# Patient Record
Sex: MALE | Race: OTHER | HISPANIC OR LATINO | Employment: UNEMPLOYED | URBAN - METROPOLITAN AREA
[De-identification: names, ages, dates, MRNs, and addresses within clinical notes are randomized per-mention and may not be internally consistent; named-entity substitution may affect disease eponyms.]

---

## 2017-10-10 ENCOUNTER — HOSPITAL ENCOUNTER (EMERGENCY)
Facility: HOSPITAL | Age: 37
Discharge: HOME | End: 2017-10-11
Attending: EMERGENCY MEDICINE
Payer: SUBSIDIZED

## 2017-10-10 VITALS
RESPIRATION RATE: 18 BRPM | HEART RATE: 75 BPM | OXYGEN SATURATION: 98 % | TEMPERATURE: 99 F | BODY MASS INDEX: 26.06 KG/M2 | HEIGHT: 67 IN | DIASTOLIC BLOOD PRESSURE: 69 MMHG | WEIGHT: 166 LBS | SYSTOLIC BLOOD PRESSURE: 144 MMHG

## 2017-10-10 DIAGNOSIS — N39.0 UTI (URINARY TRACT INFECTION): Primary | ICD-10-CM

## 2017-10-10 DIAGNOSIS — N30.90 CYSTITIS: ICD-10-CM

## 2017-10-10 PROCEDURE — 87077 CULTURE AEROBIC IDENTIFY: CPT | Performed by: EMERGENCY MEDICINE

## 2017-10-10 PROCEDURE — 81001 URINALYSIS AUTO W/SCOPE: CPT | Performed by: EMERGENCY MEDICINE

## 2017-10-10 PROCEDURE — 87086 URINE CULTURE/COLONY COUNT: CPT | Performed by: EMERGENCY MEDICINE

## 2017-10-10 PROCEDURE — 87186 SC STD MICRODIL/AGAR DIL: CPT | Performed by: EMERGENCY MEDICINE

## 2017-10-10 RX ORDER — KETOROLAC TROMETHAMINE 30 MG/ML
30 INJECTION, SOLUTION INTRAMUSCULAR; INTRAVENOUS ONCE
Status: COMPLETED | OUTPATIENT
Start: 2017-10-11 | End: 2017-10-11

## 2017-10-10 RX ORDER — ONDANSETRON 2 MG/ML
4 INJECTION INTRAMUSCULAR; INTRAVENOUS ONCE
Status: COMPLETED | OUTPATIENT
Start: 2017-10-11 | End: 2017-10-11

## 2017-10-11 ENCOUNTER — APPOINTMENT (EMERGENCY)
Dept: RADIOLOGY | Facility: HOSPITAL | Age: 37
End: 2017-10-11
Payer: SUBSIDIZED

## 2017-10-11 LAB
ANION GAP SERPL CALCULATED.3IONS-SCNC: 11 MMOL/L (ref 4–13)
BACTERIA UR QL AUTO: ABNORMAL /HPF
BASOPHILS # BLD AUTO: 0 THOUSANDS/ΜL (ref 0–0.1)
BASOPHILS NFR BLD AUTO: 0 % (ref 0–1)
BILIRUB UR QL STRIP: NEGATIVE
BUN SERPL-MCNC: 15 MG/DL (ref 5–25)
CALCIUM SERPL-MCNC: 9.4 MG/DL (ref 8.3–10.1)
CHLORIDE SERPL-SCNC: 104 MMOL/L (ref 100–108)
CLARITY UR: ABNORMAL
CO2 SERPL-SCNC: 25 MMOL/L (ref 21–32)
COLOR UR: ABNORMAL
CREAT SERPL-MCNC: 0.95 MG/DL (ref 0.6–1.3)
EOSINOPHIL # BLD AUTO: 0.1 THOUSAND/ΜL (ref 0–0.61)
EOSINOPHIL NFR BLD AUTO: 1 % (ref 0–6)
ERYTHROCYTE [DISTWIDTH] IN BLOOD BY AUTOMATED COUNT: 13.9 % (ref 11.6–15.1)
GFR SERPL CREATININE-BSD FRML MDRD: 100 ML/MIN/1.73SQ M
GLUCOSE SERPL-MCNC: 96 MG/DL (ref 65–140)
GLUCOSE UR STRIP-MCNC: NEGATIVE MG/DL
HCT VFR BLD AUTO: 43.4 % (ref 42–52)
HGB BLD-MCNC: 14.1 G/DL (ref 14–18)
HGB UR QL STRIP.AUTO: ABNORMAL
KETONES UR STRIP-MCNC: NEGATIVE MG/DL
LEUKOCYTE ESTERASE UR QL STRIP: ABNORMAL
LG PLATELETS BLD QL SMEAR: PRESENT
LYMPHOCYTES # BLD AUTO: 1.8 THOUSANDS/ΜL (ref 0.6–4.47)
LYMPHOCYTES NFR BLD AUTO: 14 % (ref 14–44)
MCH RBC QN AUTO: 27.9 PG (ref 27–31)
MCHC RBC AUTO-ENTMCNC: 32.5 G/DL (ref 31.4–37.4)
MCV RBC AUTO: 86 FL (ref 82–98)
MONOCYTES # BLD AUTO: 1 THOUSAND/ΜL (ref 0.17–1.22)
MONOCYTES NFR BLD AUTO: 7 % (ref 4–12)
NEUTROPHILS # BLD AUTO: 10.5 THOUSANDS/ΜL (ref 1.85–7.62)
NEUTS SEG NFR BLD AUTO: 78 % (ref 43–75)
NITRITE UR QL STRIP: NEGATIVE
NON-SQ EPI CELLS URNS QL MICRO: ABNORMAL /HPF
NRBC BLD AUTO-RTO: 0 /100 WBCS
PH UR STRIP.AUTO: 6 [PH] (ref 5–9)
PLATELET # BLD AUTO: 154 THOUSANDS/UL (ref 130–400)
PLATELET BLD QL SMEAR: ADEQUATE
PMV BLD AUTO: 9.6 FL (ref 8.9–12.7)
POTASSIUM SERPL-SCNC: 3.9 MMOL/L (ref 3.5–5.3)
PROT UR STRIP-MCNC: ABNORMAL MG/DL
RBC # BLD AUTO: 5.05 MILLION/UL (ref 4.7–6.1)
RBC #/AREA URNS AUTO: ABNORMAL /HPF
SODIUM SERPL-SCNC: 140 MMOL/L (ref 136–145)
SP GR UR STRIP.AUTO: <=1.005 (ref 1–1.03)
UROBILINOGEN UR QL STRIP.AUTO: 0.2 E.U./DL
WBC # BLD AUTO: 13.4 THOUSAND/UL (ref 4.8–10.8)
WBC #/AREA URNS AUTO: ABNORMAL /HPF

## 2017-10-11 PROCEDURE — 96374 THER/PROPH/DIAG INJ IV PUSH: CPT

## 2017-10-11 PROCEDURE — 36415 COLL VENOUS BLD VENIPUNCTURE: CPT | Performed by: EMERGENCY MEDICINE

## 2017-10-11 PROCEDURE — 74176 CT ABD & PELVIS W/O CONTRAST: CPT

## 2017-10-11 PROCEDURE — 96375 TX/PRO/DX INJ NEW DRUG ADDON: CPT

## 2017-10-11 PROCEDURE — 80048 BASIC METABOLIC PNL TOTAL CA: CPT | Performed by: EMERGENCY MEDICINE

## 2017-10-11 PROCEDURE — 85025 COMPLETE CBC W/AUTO DIFF WBC: CPT | Performed by: EMERGENCY MEDICINE

## 2017-10-11 PROCEDURE — 99284 EMERGENCY DEPT VISIT MOD MDM: CPT

## 2017-10-11 PROCEDURE — 96361 HYDRATE IV INFUSION ADD-ON: CPT

## 2017-10-11 RX ORDER — ONDANSETRON 4 MG/1
4 TABLET, FILM COATED ORAL EVERY 6 HOURS
Qty: 9 TABLET | Refills: 0 | Status: SHIPPED | OUTPATIENT
Start: 2017-10-11 | End: 2017-10-14

## 2017-10-11 RX ORDER — SULFAMETHOXAZOLE AND TRIMETHOPRIM 800; 160 MG/1; MG/1
1 TABLET ORAL 2 TIMES DAILY
Qty: 14 TABLET | Refills: 0 | Status: SHIPPED | OUTPATIENT
Start: 2017-10-11 | End: 2017-10-18

## 2017-10-11 RX ORDER — OXYCODONE HYDROCHLORIDE AND ACETAMINOPHEN 5; 325 MG/1; MG/1
1 TABLET ORAL EVERY 8 HOURS PRN
Qty: 10 TABLET | Refills: 0 | Status: SHIPPED | OUTPATIENT
Start: 2017-10-11 | End: 2017-10-14

## 2017-10-11 RX ORDER — NAPROXEN 500 MG/1
500 TABLET ORAL 2 TIMES DAILY WITH MEALS
Qty: 10 TABLET | Refills: 0 | Status: SHIPPED | OUTPATIENT
Start: 2017-10-11 | End: 2017-10-16

## 2017-10-11 RX ORDER — SULFAMETHOXAZOLE AND TRIMETHOPRIM 800; 160 MG/1; MG/1
1 TABLET ORAL ONCE
Status: COMPLETED | OUTPATIENT
Start: 2017-10-11 | End: 2017-10-11

## 2017-10-11 RX ADMIN — KETOROLAC TROMETHAMINE 30 MG: 30 INJECTION, SOLUTION INTRAMUSCULAR at 00:25

## 2017-10-11 RX ADMIN — SODIUM CHLORIDE 1000 ML: 0.9 INJECTION, SOLUTION INTRAVENOUS at 00:38

## 2017-10-11 RX ADMIN — HYDROMORPHONE HYDROCHLORIDE 1 MG: 1 INJECTION, SOLUTION INTRAMUSCULAR; INTRAVENOUS; SUBCUTANEOUS at 00:42

## 2017-10-11 RX ADMIN — SULFAMETHOXAZOLE AND TRIMETHOPRIM 1 TABLET: 800; 160 TABLET ORAL at 00:42

## 2017-10-11 RX ADMIN — ONDANSETRON 4 MG: 2 INJECTION INTRAMUSCULAR; INTRAVENOUS at 00:26

## 2017-10-11 NOTE — ED PROVIDER NOTES
History  Chief Complaint   Patient presents with    Abdominal Pain     patient c/o lower abdominal pain does has a history of kidney stones     20-year-old male presents with dysuria urgency frequency with urinary symptoms as well as lower abdominal pain that started a day ago  History of kidney stones noted  Hematuria noted  Not on anticoagulation  Denies any nausea vomiting diarrhea constipation fevers chills or any other symptoms  The pain is continuous currently sharp 10/10 nothing makes it better or worse  History provided by:  Patient   used: No        None       Past Medical History:   Diagnosis Date    Kidney stone        Past Surgical History:   Procedure Laterality Date    KIDNEY STONE SURGERY         History reviewed  No pertinent family history  I have reviewed and agree with the history as documented  Social History   Substance Use Topics    Smoking status: Never Smoker    Smokeless tobacco: Never Used    Alcohol use No        Review of Systems   All other systems reviewed and are negative  Physical Exam  ED Triage Vitals [10/10/17 2354]   Temperature Pulse Respirations Blood Pressure SpO2   99 °F (37 2 °C) 75 18 144/69 98 %      Temp Source Heart Rate Source Patient Position - Orthostatic VS BP Location FiO2 (%)   Oral -- Sitting Left arm --      Pain Score       8           Physical Exam   Constitutional: He is oriented to person, place, and time  He appears well-developed and well-nourished  HENT:   Head: Normocephalic and atraumatic  Eyes: EOM are normal  Pupils are equal, round, and reactive to light  Neck: Normal range of motion  Neck supple  Cardiovascular: Normal rate and regular rhythm  Pulmonary/Chest: Effort normal and breath sounds normal    Abdominal: Soft  Bowel sounds are normal  He exhibits no distension and no mass  There is tenderness  There is no rebound and no guarding  No hernia     Diffuse lower abdominal tenderness noted Musculoskeletal: Normal range of motion  Neurological: He is alert and oriented to person, place, and time  Skin: Skin is warm and dry  Psychiatric: He has a normal mood and affect  Nursing note and vitals reviewed        ED Medications  Medications   sodium chloride 0 9 % bolus 1,000 mL (1,000 mL Intravenous New Bag 10/11/17 0038)   sulfamethoxazole-trimethoprim (BACTRIM DS) 800-160 mg per tablet 1 tablet (not administered)   ketorolac (TORADOL) 30 mg/mL injection 30 mg (30 mg Intravenous Given 10/11/17 0025)   ondansetron (ZOFRAN) injection 4 mg (4 mg Intravenous Given 10/11/17 0026)       Diagnostic Studies  Labs Reviewed   UA W REFLEX TO MICROSCOPIC WITH REFLEX TO CULTURE - Abnormal        Result Value Ref Range Status    Leukocytes, UA Large (*) Negative Final    Protein,  (2+) (*) Negative mg/dl Final    Blood, UA Large (*) Negative Final    Color, UA Cora   Final    Clarity, UA Slightly Cloudy   Final    Specific Gravity, UA <=1 005  1 000 - 1 030 Final    pH, UA 6 0  5 0 - 9 0 Final    Nitrite, UA Negative  Negative Final    Glucose, UA Negative  Negative mg/dl Final    Ketones, UA Negative  Negative mg/dl Final    Urobilinogen, UA 0 2  0 2, 1 0 E U /dl E U /dl Final    Bilirubin, UA Negative  Negative Final   CBC AND DIFFERENTIAL - Abnormal     WBC 13 40 (*) 4 80 - 10 80 Thousand/uL Final    Neutrophils Relative 78 (*) 43 - 75 % Final    Neutrophils Absolute 10 50 (*) 1 85 - 7 62 Thousands/µL Final    RBC 5 05  4 70 - 6 10 Million/uL Final    Hemoglobin 14 1  14 0 - 18 0 g/dL Final    Hematocrit 43 4  42 0 - 52 0 % Final    MCV 86  82 - 98 fL Final    MCH 27 9  27 0 - 31 0 pg Final    MCHC 32 5  31 4 - 37 4 g/dL Final    RDW 13 9  11 6 - 15 1 % Final    MPV 9 6  8 9 - 12 7 fL Final    Platelets 043  428 - 400 Thousands/uL Final    nRBC 0  /100 WBCs Final    Lymphocytes Relative 14  14 - 44 % Final    Monocytes Relative 7  4 - 12 % Final    Eosinophils Relative 1  0 - 6 % Final    Basophils Relative 0  0 - 1 % Final    Lymphocytes Absolute 1 80  0 60 - 4 47 Thousands/µL Final    Monocytes Absolute 1 00  0 17 - 1 22 Thousand/µL Final    Eosinophils Absolute 0 10  0 00 - 0 61 Thousand/µL Final    Basophils Absolute 0 00  0 00 - 0 10 Thousands/µL Final   URINE MICROSCOPIC - Abnormal     RBC, UA 10-20 (*) None Seen, 0-5 /hpf Final    WBC, UA Innumerable (*) None Seen, 0-5, 5-55, 5-65 /hpf Final    Epithelial Cells None Seen  None Seen, Occasional /hpf Final    Bacteria, UA Occasional  None Seen, Occasional /hpf Final   URINE CULTURE   BASIC METABOLIC PANEL    Sodium 423  136 - 145 mmol/L Final    Potassium 3 9  3 5 - 5 3 mmol/L Final    Chloride 104  100 - 108 mmol/L Final    CO2 25  21 - 32 mmol/L Final    Anion Gap 11  4 - 13 mmol/L Final    BUN 15  5 - 25 mg/dL Final    Creatinine 0 95  0 60 - 1 30 mg/dL Final    Comment: Standardized to IDMS reference method    Glucose 96  65 - 140 mg/dL Final    Comment:   If the patient is fasting, the ADA then defines impaired fasting glucose as > 100 mg/dL and diabetes as > or equal to 123 mg/dL  Specimen collection should occur prior to Sulfasalazine administration due to the potential for falsely depressed results  Specimen collection should occur prior to Sulfapyridine administration due to the potential for falsely elevated results  Calcium 9 4  8 3 - 10 1 mg/dL Final    eGFR 100  ml/min/1 73sq m Final    Narrative:     National Kidney Disease Education Program recommendations are as follows:  GFR calculation is accurate only with a steady state creatinine  Chronic Kidney disease less than 60 ml/min/1 73 sq  meters  Kidney failure less than 15 ml/min/1 73 sq  meters     SMEAR REVIEW(PHLEBS DO NOT ORDER)    Platelet Estimate Adequate  Adequate Final    Large Platelet Present   Final       CT renal stone study abdomen pelvis without contrast    (Results Pending)       Procedures  Procedures      Phone Contacts  ED Phone Contact    ED Course  ED Course MDM  Number of Diagnoses or Management Options  Diagnosis management comments: Ureteral stone, UTI, pyelonephritis, colitis, diverticulitis, abdominal pathology, abdominal pain    CT scan of the abdomen/pelvis pending care transitioned to Dr Regan Lancaster and/or Complexity of Data Reviewed  Clinical lab tests: ordered and reviewed  Tests in the radiology section of CPT®: ordered  Tests in the medicine section of CPT®: ordered and reviewed    Patient Progress  Patient progress: stable    CritCare Time    Disposition  Final diagnoses:   Flank pain   UTI (urinary tract infection)     ED Disposition     None      Follow-up Information    None       Patient's Medications    No medications on file     No discharge procedures on file      ED Provider  Electronically Signed by       Yuki Viera DO  10/11/17 7764

## 2017-10-11 NOTE — ED CARE HANDOFF
Emergency Department Sign Out Note        Sign out and transfer of care from Dr Pavel Camejo  See Separate Emergency Department note  The patient, Marquez Dacosta, was evaluated by the previous provider for hematuria, dysuria, low abd pain    Workup Completed:  Labs/UA    ED Course / Workup Pending (followup):  CT abd/pelvis report    Pt received in sign out, pending CT abd/pelvis report  CT reviewed  No acute pathology  Pt seen and evaluated  + urinary symptoms and low abd pain  Pt with UTI/cystitis  Will d/c to home with PCP f/u  ED Course      Procedures  MDM  CritCare Time      Disposition  Final diagnoses:   Flank pain   UTI (urinary tract infection)     ED Disposition     None      Follow-up Information    None       Patient's Medications   Discharge Prescriptions    NAPROXEN (NAPROSYN) 500 MG TABLET    Take 1 tablet by mouth 2 (two) times a day with meals for 5 days       Start Date: 10/11/2017End Date: 10/16/2017       Order Dose: 500 mg       Quantity: 10 tablet    Refills: 0    ONDANSETRON (ZOFRAN) 4 MG TABLET    Take 1 tablet by mouth every 6 (six) hours for 3 days       Start Date: 10/11/2017End Date: 10/14/2017       Order Dose: 4 mg       Quantity: 9 tablet    Refills: 0    OXYCODONE-ACETAMINOPHEN (PERCOCET) 5-325 MG PER TABLET    Take 1 tablet by mouth every 8 (eight) hours as needed for moderate pain for up to 3 days Max Daily Amount: 3 tablets       Start Date: 10/11/2017End Date: 10/14/2017       Order Dose: 1 tablet       Quantity: 10 tablet    Refills: 0    SULFAMETHOXAZOLE-TRIMETHOPRIM (BACTRIM DS) 800-160 MG PER TABLET    Take 1 tablet by mouth 2 (two) times a day for 7 days smx-tmp DS (BACTRIM) 800-160 mg tabs (1tab q12 D10)       Start Date: 10/11/2017End Date: 10/18/2017       Order Dose: 1 tablet       Quantity: 14 tablet    Refills: 0     No discharge procedures on file         ED Provider  Electronically Signed by

## 2017-10-11 NOTE — DISCHARGE INSTRUCTIONS
Infección del tracto urinario en los hombres   LO QUE NECESITA SABER:   Yessy infección en el tracto urinario (ITU) ocurre cuando entran bacterias en lyman tracto urinario  La mayoría de las bacterias que entran al tracto urinario salen al Estela Cornet  Si la bacteria permanece en el tracto urinario, usted podría contraer Pitney Spencer  Lyman tracto urinario incluye francisca riñones, uréteres, vejiga y Gondregnies  La orina es producida en los riñones y fluye del uréter a la vejiga  La orina sale de la vejiga a través de la uretra  Yessy infección del tracto urinario es más común in [place] parte inferior de lyman tracto urinario que incluye lyman vejiga y uretra  INSTRUCCIONES SOBRE EL [facility]ERMAINE HOSPITALARIA:   Regrese a la delmi de emergencias si:   · Usted está orinando muy poco o nada en absoluto  · Usted tiene fiebre jermaine con temblor y escalofríos  · Usted tiene dolor en el costado o en la espalda que STACEY  Pregúntele a lyman Cir[employer] vitaminas y minerales son adecuados para usted  · Usted tiene fiebre leve  · Usted no siente mejoría después de 2 días de vargas los antibióticos  · Usted esta vomitando  · Usted tiene síntomas nuevos, tales janet pankaj o pus en la orina  · Usted tiene preguntas o inquietudes acerca de lyman condición o cuidado  Medicamentos:   · Antibióticos  ayudan a combatir yessy infección bacterial      · Medicamentos,  para disminuir el dolor y el ardor al orinar  También ayudarán a disminuir la sensación de necesitar orinar con frecuencia  Estos medicamentos harán que orine de color anaranjado o rodríguez  · [place] francisca medicamentos janet se le haya indicado  Consulte con lyman médico si usted kristin que lyman medicamento no le está ayudando o si presenta efectos secundarios  Infórmele si es alérgico a algún medicamento  Mantenga yessy lista actualizada de los [place], las vitaminas y los productos herbales que jelly  Incluya los siguientes datos de los medicamentos: cantidad, frecuencia y motivo de administración  Traiga con usted la lista o los envases de la píldoras a francisca citas de seguimiento  Lleve la lista de los medicamentos con usted en kimberly de yessy emergencia  Evite otra ITU:   · Vacíe la vejiga con frecuencia  Orine y vacíe la vejiga tan pronto janet usted sienta la necesidad  No retenga la orina por largos períodos de [place]  · [address] se le haya indicado  Pregunte cuánto líquido debe vargas cada día y cuáles líquidos son los más adecuados para usted  Es probable que usted necesite vargas más líquidos de lo habitual para ayudar a deshacerse de la bacteria  No tome alcohol, cafeína ni jugos cítricos  Estos pueden irritar hogue vejiga y aumentar francisca síntomas  Hogue médico puede recomendarle el jugo de arándano para prevenir yessy infección Chata Dorsey  · Orine después de [employer]  [place] puede ayudar a eliminar las bacterias que pasan maribell el sexo  · Realice ejercicios para los músculos pélvicos con frecuencia  Los músculos pélvicos ayudan a empezar y parar de orinar  Los músculos pélvicos anton ayudan a vaciar la vejiga más fácilmente  Apriete estos músculos firmemente maribell 5 segundos janet si estuviera tratando de retener el flujo de [place]  Luego relaje por 5 segundos  Aumente gradualmente a 10 segundos  [place]ade 3 series de 15 repeticiones al día o janet se le indique  Acuda a francisca consultas de control con hogue médico según le indicaron  Anote francisca preguntas para que se acuerde de hacerlas maribell francisca visitas  © 2017 [address] Information is for End User's use only and may not be sold, redistributed or otherwise used for commercial purposes  All illustrations and images included in CareNotes® are the copyrighted property of A D A M , Inc  or Caesar Garay  Esta información es sólo para uso en educación  Hogue intención no es darle un consejo médico sobre enfermedades o tratamientos   Colsulte con hogue Dorna Claudio farmacéutico antes de seguir cualquier [place] médico para saber si es seguro y efectivo para usted

## 2017-10-13 LAB — BACTERIA UR CULT: ABNORMAL

## 2018-09-22 ENCOUNTER — HOSPITAL ENCOUNTER (EMERGENCY)
Facility: HOSPITAL | Age: 38
Discharge: HOME | End: 2018-09-22
Attending: EMERGENCY MEDICINE | Admitting: EMERGENCY MEDICINE
Payer: SUBSIDIZED

## 2018-09-22 ENCOUNTER — APPOINTMENT (EMERGENCY)
Dept: RADIOLOGY | Facility: HOSPITAL | Age: 38
End: 2018-09-22
Payer: SUBSIDIZED

## 2018-09-22 VITALS
HEIGHT: 67 IN | WEIGHT: 160 LBS | BODY MASS INDEX: 25.11 KG/M2 | TEMPERATURE: 97.8 F | RESPIRATION RATE: 22 BRPM | OXYGEN SATURATION: 100 % | HEART RATE: 76 BPM

## 2018-09-22 DIAGNOSIS — N40.0 ENLARGED PROSTATE: ICD-10-CM

## 2018-09-22 DIAGNOSIS — N13.2 HYDRONEPHROSIS WITH OBSTRUCTING CALCULUS: ICD-10-CM

## 2018-09-22 DIAGNOSIS — N23 URETERAL COLIC: Primary | ICD-10-CM

## 2018-09-22 LAB
ALBUMIN SERPL BCP-MCNC: 3.9 G/DL (ref 3.5–5)
ALP SERPL-CCNC: 68 U/L (ref 46–116)
ALT SERPL W P-5'-P-CCNC: 39 U/L (ref 12–78)
ANION GAP SERPL CALCULATED.3IONS-SCNC: 10 MMOL/L (ref 4–13)
AST SERPL W P-5'-P-CCNC: 19 U/L (ref 5–45)
BACTERIA UR QL AUTO: ABNORMAL /HPF
BASOPHILS # BLD AUTO: 0.03 THOUSANDS/ΜL (ref 0–0.1)
BASOPHILS NFR BLD AUTO: 0 % (ref 0–1)
BILIRUB SERPL-MCNC: 0.3 MG/DL (ref 0.2–1)
BILIRUB UR QL STRIP: NEGATIVE
BUN SERPL-MCNC: 15 MG/DL (ref 5–25)
CALCIUM SERPL-MCNC: 9 MG/DL (ref 8.3–10.1)
CHLORIDE SERPL-SCNC: 103 MMOL/L (ref 100–108)
CLARITY UR: CLEAR
CO2 SERPL-SCNC: 24 MMOL/L (ref 21–32)
COLOR UR: YELLOW
CREAT SERPL-MCNC: 1.09 MG/DL (ref 0.6–1.3)
EOSINOPHIL # BLD AUTO: 0.16 THOUSAND/ΜL (ref 0–0.61)
EOSINOPHIL NFR BLD AUTO: 2 % (ref 0–6)
ERYTHROCYTE [DISTWIDTH] IN BLOOD BY AUTOMATED COUNT: 12.6 % (ref 11.6–15.1)
GFR SERPL CREATININE-BSD FRML MDRD: 84 ML/MIN/1.73SQ M
GLUCOSE SERPL-MCNC: 125 MG/DL (ref 65–140)
GLUCOSE UR STRIP-MCNC: NEGATIVE MG/DL
HCT VFR BLD AUTO: 43.1 % (ref 36.5–49.3)
HGB BLD-MCNC: 14.2 G/DL (ref 12–17)
HGB UR QL STRIP.AUTO: ABNORMAL
HOLD SPECIMEN: NORMAL
IMM GRANULOCYTES # BLD AUTO: 0.05 THOUSAND/UL (ref 0–0.2)
IMM GRANULOCYTES NFR BLD AUTO: 1 % (ref 0–2)
KETONES UR STRIP-MCNC: NEGATIVE MG/DL
LEUKOCYTE ESTERASE UR QL STRIP: NEGATIVE
LIPASE SERPL-CCNC: 124 U/L (ref 73–393)
LYMPHOCYTES # BLD AUTO: 3.7 THOUSANDS/ΜL (ref 0.6–4.47)
LYMPHOCYTES NFR BLD AUTO: 37 % (ref 14–44)
MCH RBC QN AUTO: 28.2 PG (ref 26.8–34.3)
MCHC RBC AUTO-ENTMCNC: 32.9 G/DL (ref 31.4–37.4)
MCV RBC AUTO: 86 FL (ref 82–98)
MONOCYTES # BLD AUTO: 0.81 THOUSAND/ΜL (ref 0.17–1.22)
MONOCYTES NFR BLD AUTO: 8 % (ref 4–12)
NEUTROPHILS # BLD AUTO: 5.21 THOUSANDS/ΜL (ref 1.85–7.62)
NEUTS SEG NFR BLD AUTO: 52 % (ref 43–75)
NITRITE UR QL STRIP: NEGATIVE
NON-SQ EPI CELLS URNS QL MICRO: ABNORMAL /HPF
NRBC BLD AUTO-RTO: 0 /100 WBCS
PH UR STRIP.AUTO: 6 [PH] (ref 5–9)
PLATELET # BLD AUTO: 221 THOUSANDS/UL (ref 149–390)
PMV BLD AUTO: 11.3 FL (ref 8.9–12.7)
POTASSIUM SERPL-SCNC: 3.4 MMOL/L (ref 3.5–5.3)
PROT SERPL-MCNC: 7.4 G/DL (ref 6.4–8.2)
PROT UR STRIP-MCNC: ABNORMAL MG/DL
RBC # BLD AUTO: 5.04 MILLION/UL (ref 3.88–5.62)
RBC #/AREA URNS AUTO: ABNORMAL /HPF
SODIUM SERPL-SCNC: 137 MMOL/L (ref 136–145)
SP GR UR STRIP.AUTO: >=1.03 (ref 1–1.03)
UROBILINOGEN UR QL STRIP.AUTO: 0.2 E.U./DL
WBC # BLD AUTO: 9.96 THOUSAND/UL (ref 4.31–10.16)
WBC #/AREA URNS AUTO: ABNORMAL /HPF

## 2018-09-22 PROCEDURE — 96374 THER/PROPH/DIAG INJ IV PUSH: CPT

## 2018-09-22 PROCEDURE — 83690 ASSAY OF LIPASE: CPT | Performed by: EMERGENCY MEDICINE

## 2018-09-22 PROCEDURE — 36415 COLL VENOUS BLD VENIPUNCTURE: CPT | Performed by: EMERGENCY MEDICINE

## 2018-09-22 PROCEDURE — 80053 COMPREHEN METABOLIC PANEL: CPT | Performed by: EMERGENCY MEDICINE

## 2018-09-22 PROCEDURE — 74176 CT ABD & PELVIS W/O CONTRAST: CPT

## 2018-09-22 PROCEDURE — 96361 HYDRATE IV INFUSION ADD-ON: CPT

## 2018-09-22 PROCEDURE — 96375 TX/PRO/DX INJ NEW DRUG ADDON: CPT

## 2018-09-22 PROCEDURE — 99284 EMERGENCY DEPT VISIT MOD MDM: CPT

## 2018-09-22 PROCEDURE — 85025 COMPLETE CBC W/AUTO DIFF WBC: CPT | Performed by: EMERGENCY MEDICINE

## 2018-09-22 PROCEDURE — 81001 URINALYSIS AUTO W/SCOPE: CPT | Performed by: EMERGENCY MEDICINE

## 2018-09-22 RX ORDER — TAMSULOSIN HYDROCHLORIDE 0.4 MG/1
0.4 CAPSULE ORAL
Qty: 30 CAPSULE | Refills: 0 | Status: SHIPPED | OUTPATIENT
Start: 2018-09-22

## 2018-09-22 RX ORDER — HYDROCODONE BITARTRATE AND ACETAMINOPHEN 5; 325 MG/1; MG/1
1 TABLET ORAL EVERY 6 HOURS PRN
Qty: 12 TABLET | Refills: 0 | Status: SHIPPED | OUTPATIENT
Start: 2018-09-22 | End: 2018-10-02

## 2018-09-22 RX ORDER — ONDANSETRON 4 MG/1
4 TABLET, FILM COATED ORAL EVERY 6 HOURS
Qty: 12 TABLET | Refills: 0 | Status: SHIPPED | OUTPATIENT
Start: 2018-09-22

## 2018-09-22 RX ORDER — IBUPROFEN 600 MG/1
600 TABLET ORAL EVERY 6 HOURS PRN
Qty: 30 TABLET | Refills: 0 | Status: SHIPPED | OUTPATIENT
Start: 2018-09-22

## 2018-09-22 RX ORDER — KETOROLAC TROMETHAMINE 30 MG/ML
30 INJECTION, SOLUTION INTRAMUSCULAR; INTRAVENOUS ONCE
Status: COMPLETED | OUTPATIENT
Start: 2018-09-22 | End: 2018-09-22

## 2018-09-22 RX ORDER — TAMSULOSIN HYDROCHLORIDE 0.4 MG/1
0.4 CAPSULE ORAL ONCE
Status: COMPLETED | OUTPATIENT
Start: 2018-09-22 | End: 2018-09-22

## 2018-09-22 RX ORDER — ONDANSETRON 2 MG/ML
4 INJECTION INTRAMUSCULAR; INTRAVENOUS ONCE
Status: COMPLETED | OUTPATIENT
Start: 2018-09-22 | End: 2018-09-22

## 2018-09-22 RX ADMIN — ONDANSETRON 4 MG: 2 INJECTION INTRAMUSCULAR; INTRAVENOUS at 06:38

## 2018-09-22 RX ADMIN — KETOROLAC TROMETHAMINE 30 MG: 30 INJECTION, SOLUTION INTRAMUSCULAR at 06:49

## 2018-09-22 RX ADMIN — SODIUM CHLORIDE 1000 ML: 0.9 INJECTION, SOLUTION INTRAVENOUS at 06:49

## 2018-09-22 RX ADMIN — TAMSULOSIN HYDROCHLORIDE 0.4 MG: 0.4 CAPSULE ORAL at 07:54

## 2018-09-22 NOTE — ED NOTES
Discharge instructions reviewed with patient at length  Patient states understanding  Patient discharged to home       nÁgela Hernandez RN  09/22/18 [id number]

## 2018-09-22 NOTE — DISCHARGE INSTRUCTIONS
Hipertrofia prostática benigna   LO QUE NECESITA SABER:   La hipertrofia prostática benigna (HPB) es yessy condición que provoca que la glándula prostática crezca mas cesar de lo normal  La glándula prostática es la glándula sexual masculina que produce el líquido que es parte del semen  Tiene el tamaño aproximadamente de [place] y está localizado debajo de la vejiga  Conforme la próstata crece, puede apretar la uretra  [place] puede obstruir el flujo de [place] y provocar problemas urinarios  INSTRUCCIONES SOBRE EL JERMAINE HOSPITALARIA:   Medicamentos:   · Bloqueante gerry:  Estos medicamentos relajan los músculos de la próstata y de la vejiga  Podrían ayudarlo a orinar con mas facilidad  · Inhibidores de la 5 gerry reductasa:  Estos medicamentos obstruyen la producción de yessy hormona que provoca que la próstata se agrande mas  Podrían ayudar a disminuir el crecimiento de la próstata o a encogerla  · [place] francisca medicamentos janet se le haya indicado  Consulte con lyman médico si usted kristin que lyman medicamento no le está ayudando o si presenta efectos secundarios  Infórmele si es alérgico a algún medicamento  Mantenga yessy lista actualizada de los [place], las vitaminas y los productos herbales que jelly  Incluya los siguientes datos de los medicamentos: cantidad, frecuencia y motivo de administración  Traiga con usted la lista o los envases de la píldoras a francisca citas de seguimiento  Lleve la lista de los medicamentos con usted en kimberly de yessy emergencia  Acuda a francisca consultas de control con lyman médico según le indicaron  Anote francisca preguntas para que se acuerde de hacerlas maribell francisca visitas  Controle la HPB:   · No permita que lyman vejiga se llene demasiado antes de vaciarla  Orine cuando sienta el impulso de [place]  · Limite el consumo de alcohol  No ingiera grandes cantidades de líquidos de [employer]  · Brianafurt cantidad de sal que consume   Por ejemplo, las aline fritas, las ashley curadas y las sopas enlatadas  No use sal de [place]  · Los médicos podrían indicarle que no consuma alimentos picantes, janet ASKER  [place] podría ayudarlo a determinar si la comida picante empeora los síntomas de HPB  · Usted puede tener relaciones sexuales si se siente raul  Pregúntele a hogue Sherol Mince vitaminas y minerales son adecuados para usted  · Hay yessy cantidad cesar de pankaj en la orina  · Francisca signos y síntomas empeoran  · Usted tiene fiebre  · Usted tiene preguntas o inquietudes acerca de hogue condición o cuidado  Regrese a la delmi de emergencias si:   · Usted no puede orinar  · Siente que la vejiga está muy llena y tiene dolor  © 2017 2600 Jeremy Vick Information is for End User's use only and may not be sold, redistributed or otherwise used for commercial purposes  All illustrations and images included in CareNotes® are the copyrighted property of A D A M , Inc  or Caesar Garay  Esta información es sólo para uso en educación  Hogue intención no es darle un consejo médico sobre enfermedades o tratamientos  Colsulte con hogue Mardell Meghana farmacéutico antes de seguir cualquier régimen médico para saber si es seguro y efectivo para usted  Hidronefrosis   LO QUE NECESITA SABER:   La hidronefrosis es inflamación en fredi o ambos riñones causada por la acumulación de [place]  La orina generalmente fluye de los riñones a la vejiga por medio de unos tubos llamados uréteres  Yessy obstrucción en los uréteres pueden evitar que la orina fluya apropiadamente  El flujo de la orina podría también ser interrumpido o retrasado cuando los riñones no funcionan correctamente  La orina vuelve a hogue tracto urinario  Se acumula presión en el riñón, lo cual provoca hinchazón  INSTRUCCIONES SOBRE EL JERMAINE HOSPITALARIA:   [place] yessy rudy de control con hogue médico o especialista janet se lo indicaron:  Es probable que lo refieran a un ginecólogo, oncólogo o urólogo para más pruebas y tratamiento   Anote francisca preguntas para que se acuerde de hacerlas maribell francisca visitas  Pregúntele a hogue Desiree Rumps vitaminas y minerales son adecuados para usted  · Hogue abdomen se siente lleno  · Usted nota un cambio en la cantidad o frecuencia de la orina  · Usted orina más veces por la noche y en cantidades mayores que maribell el día  · Usted siente dolor leve en la parte inferior de la espalda o dolor en un lado del cuerpo cuando orina  · Usted tiene preguntas o inquietudes acerca de hogue condición o cuidado  Regrese a la delmi de emergencias si:   · Usted tiene dolor de espalda intenso y penetrante  · Usted orina con pankaj  · Usted no puede orinar u Genita Gil   © 2017 2600 Jeremy Vick Information is for End User's use only and may not be sold, redistributed or otherwise used for commercial purposes  All illustrations and images included in CareNotes® are the copyrighted property of A D A M , Inc  or Caesar Garay  Esta información es sólo para uso en educación  Hogue intención no es darle un consejo médico sobre enfermedades o tratamientos  Colsulte con hogue Robbins Cranker farmacéutico antes de seguir cualquier régimen médico para saber si es seguro y efectivo para usted  Cólico renal   LO QUE NECESITA SABER:   Un cólico renal es un dolor intenso en la parte inferior de la espalda o en los costados  Usualmente el dolor se siente en un costado, quinton podría presentarse en ambos lados de la parte inferior de hogue espalda  El cólico renal podría comenzar rápidamente, ir y venir, y empeorar con el paso del [place]  Un cólico renal es causado por yessy obstrucción en el tracto urinario  La causa mas común de yessy obstrucción es yessy ernestina en el [place]  Los coágulos de [place], [place]'s [employer] uréteres y tejido muerto también podrían obstruir hogue tracto urinario  INSTRUCCIONES SOBRE EL JERMAINE HOSPITALARIA:   Regrese a la delmi de emergencias si:   · Usted no puede dejar de vomitar      · Usted nota sangrado nuevo o en aumento cuando orina  · Usted está orinando menos que de [facility], o nada en lo absoluto  · Lyman dolor no mejora aún después del [place]  Pregúntele a lyman Berneice Penta vitaminas y minerales son adecuados para usted  · Usted tiene fiebre  · Usted necesita orinar con mas frecuencia de lo normal o inmediatamente  · Usted nota un cálculo en el filtro de la orina después de Karthikeyan Eans  · Usted tiene preguntas o inquietudes acerca de lyman condición o cuidado  Medicamentos:   · Medicamentos,  podrían ayudar a disminuir el dolor y los espasmos musculares  También podría necesitar medicación para calmar el estómago y dejar de vomitar  · [place] francisca medicamentos janet se le haya indicado  Consulte con lyman médico si usted kristin que lyman medicamento no le está ayudando o si presenta efectos secundarios  Infórmele si es alérgico a algún medicamento  Mantenga yessy lista actualizada de los [place], las vitaminas y los productos herbales que jelly  Incluya los siguientes datos de los medicamentos: cantidad, frecuencia y motivo de administración  Traiga con usted la lista o los envases de la píldoras a francisca citas de seguimiento  Lleve la lista de los medicamentos con usted en kimberly de yessy emergencia  El manejo de lyman síntomas:   · Consuma líquidos según le indicaron  para ayudar a disminuir el dolor y a desechar las obstrucciones del tracto urinario  Pregunte cuánto líquido debe vargas cada día y cuáles líquidos son los más adecuados para usted  Es posible que necesite vargas alrededor de 3 litros (12 vasos) de líquidos cada día  La mitad de la cantidad total de líquido que jelly a diario debe ser [place]  Limite la cantidad de café, té y gaseosas que jelly a 2 vasos al día  La orina debería estar pálida y sung  · Filtre lyman orina cada vez que orine  Orine en un colador (embudo con Fletcher jeffrey en la parte inferior) o un recipiente de jack para recoger los cálculos renales   Entregue las piedras del riñón a hogue médico en la próxima rudy  · Consuma alimentos saludables y variados  Los alimentos saludables incluyen frutas, verduras, pan integral, productos lácteos bajos en grasa, frijoles, ashley magras y pescado  Es posible que necesite aumentar la cantidad de cítricos que consume, Donald Lopez  Pregunte a hogue médico cuánta sal, calcio y proteína usted debería consumir  · Evite realizar actividades en temperaturas altas  El calor podría provocar que se deshidrate y que orine Laura  Acuda a francisca consultas de control con hogue médico según le indicaron  Es posible que usted necesite regresar para que le realicen exámenes con el fin de revsar si la obstrucción ha desaparecido  Anote las preguntas que tenga para que no olvide hacerlas maribell hogue visita  © 2017 2600 Jeremy Vick Information is for End User's use only and may not be sold, redistributed or otherwise used for commercial purposes  All illustrations and images included in CareNotes® are the copyrighted property of A D A [employer]  or Caesar Garay  Esta información es sólo para uso en educación  Hogue intención no es darle un consejo médico sobre enfermedades o tratamientos  Colsulte con hogue Carollegraeme [place] farmacéutico antes de seguir cualquier régimen médico para saber si es seguro y efectivo para usted  Cálculos Ureterales   LO QUE NECESITA SABER:   Un cálculo ureteral es yessy ernestina que se forma en el riñón y que se mueve por el uréter y se queda atascado allí  El uréter es el tubo que lleva la orina desde el [place] a la vejiga  Los cálculos pueden formarse en el sistema urinario cuando la orina tiene niveles altos de minerales y sales  Los cálculos urinarios pueden estar formados de ácido úrico, calcio, fosfato o lorri de oxalato          INSTRUCCIONES SOBRE EL JERMAINE HOSPITALARIA:   Regrese a la delmi de emergencias si:   · Usted tiene dolor severo que no mejora, incluso después de tomarse hogue medicamento  · Usted tiene vómitos que no se alivian con medicación  · Usted presenta fiebre  Pregúntele a lyman Fredrich Files vitaminas y minerales son adecuados para usted  · Usted presenta fiebre  · Tiene alguna pregunta o inquietud acerca de lyman condición o cuidado  Acuda a francisca consultas de control con lyman médico según le indicaron  Es posible que necesite regresar para que le realicen más exámenes  Anote francisca preguntas para que se acuerde de hacerlas maribell francisca visitas  Medicamentos:  Es posible que usted necesite alguno de los siguientes:  · AINEs (Analgésicos antiinflamatorios no esteroides) janet el ibuprofeno, ayudan a disminuir la inflamación, el dolor y la fiebre  Gwendolyn medicamento esta disponible con o sin yessy receta médica  Los AINEs pueden causar sangrado estomacal o problemas renales en ciertas personas  Si usted jelly un medicamento anticoagulante, siempre pregúntele a lyman médico si los CHARLENE son seguros para usted  Siempre marlen la etiqueta de gwendolyn medicamento y [place] instrucciones  · Un medicamento con receta para el dolor  podría ayudar a disminuir el dolor o ayudar a pasar francisca cálculos ureterales  No espere hasta que el dolor sea severo antes de vargas lyman medicamento para el dolor  · Medicamento para las náuseas  podría ayudar a calmar lyman estómago y a evitar el vómito    · [place] Vera francisca medicamentos janet se le haya indicado  Consulte con lyman médico si usted kristin que lyman medicamento no le está ayudando o si presenta efectos secundarios  Infórmele si es alérgico a cualquier medicamento  Mantenga yessy lista actualizada de los [employer], las vitaminas y los productos herbales que jelly  Incluya los siguientes datos de los medicamentos: cantidad, frecuencia y motivo de administración  Traiga con usted la lista o los envases de la píldoras a francisca citas de seguimiento  Lleve la lista de los medicamentos con usted en kimberly de yessy emergencia  Cuidados personales:   · Victoria Vera suficiente líquidos    Es probable que hogue médico le indique que tome al menos 8 a 12 vasos (de ocho onzas) de líquidos al día  [place] ayuda a eliminar los cálculos cuando orina  El agua es el mejor líquido para vargas  · Cuele la orina cada vez que use el baño  Orine por medio de un colador o un pedazo de shonda loo para así recolectar los cálculos  Lleve los cálculos donde hogue médico para que pueda enviarlos al laboratorio y realizarles exámenes  [place] ayudará a hogue médico a planear el mejor tratamiento para usted  · Consulte con hogue médico sobre los cambios que usted necesita hacer  Es posible que usted necesite limitar ciertos alimentos janet los alimentos altos en sodio (sal), ciertos alimentos con proteínas o alimentos altos en oxalato  Después de pasar hogue cálculo ureteral:  Day vez que elimine el cálculo ureteral, es posible que necesite hacerse un análisis de orina de 24 horas  Posiblemente necesite guardar toda hogue orina por 24 horas  Cada vez que vaya al baño, orinará en un recipiente  Después usted recolectará la orina en un contenedor mas cesar que la mantiene fría  Es posible que le pidan que mantenga un registro de la hora y la cantidad de orina que pase  Al final de las 24 horas, la orina se envía al laboratorio para hogue examinación  Los [employer] del examen ayudarán a hogue médico a planear las formas de evitar la formación de mas cálculos  © 2017 2600 Jeremy Vick Information is for End User's use only and may not be sold, redistributed or otherwise used for commercial purposes  All illustrations and images included in CareNotes® are the copyrighted property of A D A M , Inc  or Caesar Garay  Esta información es sólo para uso en educación  Hogue intención no es darle un consejo médico sobre enfermedades o tratamientos  Colsulte con hogue [place] farmacéutico antes de seguir cualquier régimen médico para saber si es seguro y efectivo para usted

## 2018-09-22 NOTE — ED PROVIDER NOTES
History  Chief Complaint   Patient presents with    Flank Pain     patient c/o lower right side pain starting last night and some pain to the right lower back     Pt in ER with c/o right flank pain that began during the night, associated with nausea  Pt with similar pain in the past, on the left side, and he was diagnosed with a kidney stone  +dysuria, +minmal back pain  Prior to Admission Medications   Prescriptions Last Dose Informant Patient Reported? Taking?   naproxen (NAPROSYN) 500 mg tablet   No No   Sig: Take 1 tablet by mouth 2 (two) times a day with meals for 5 days   ondansetron (ZOFRAN) 4 mg tablet   No No   Sig: Take 1 tablet by mouth every 6 (six) hours for 3 days      Facility-Administered Medications: None       Past Medical History:   Diagnosis Date    Kidney stone        Past Surgical History:   Procedure Laterality Date    KIDNEY STONE SURGERY         History reviewed  No pertinent family history  I have reviewed and agree with the history as documented  Social History   Substance Use Topics    Smoking status: Never Smoker    Smokeless tobacco: Never Used    Alcohol use No        Review of Systems   Constitutional: Negative for chills and fever  Gastrointestinal: Positive for abdominal pain and nausea  Negative for diarrhea and vomiting  Genitourinary: Positive for dysuria, flank pain and penile pain  Negative for difficulty urinating, discharge, frequency, hematuria, testicular pain and urgency  Musculoskeletal: Positive for back pain  All other systems reviewed and are negative  Physical Exam  Physical Exam   Constitutional: He is oriented to person, place, and time  He appears well-developed and well-nourished  HENT:   Head: Normocephalic and atraumatic  Eyes: EOM are normal  Pupils are equal, round, and reactive to light  Cardiovascular: Normal rate, regular rhythm and normal heart sounds      Pulmonary/Chest: Effort normal and breath sounds normal  Abdominal: Soft  Bowel sounds are normal  He exhibits no distension and no mass  There is tenderness in the right lower quadrant  There is guarding  There is no rebound  Musculoskeletal: He exhibits tenderness  Right CVA tenderness   Neurological: He is alert and oriented to person, place, and time  Skin: Skin is warm and dry  Psychiatric: He has a normal mood and affect  His behavior is normal  Judgment and thought content normal    Nursing note and vitals reviewed        Vital Signs  ED Triage Vitals   Temperature Pulse Respirations BP SpO2   09/22/18 0629 09/22/18 0629 09/22/18 0629 -- 09/22/18 0629   97 8 °F (36 6 °C) 76 22  100 %      Temp Source Heart Rate Source Patient Position - Orthostatic VS BP Location FiO2 (%)   09/22/18 0629 09/22/18 0629 -- -- --   Tympanic Monitor         Pain Score       09/22/18 0649       Worst Possible Pain           Vitals:    09/22/18 0629   Pulse: 76       Visual Acuity      ED Medications  Medications   ondansetron (ZOFRAN) injection 4 mg (4 mg Intravenous Given 9/22/18 0638)   sodium chloride 0 9 % bolus 1,000 mL (0 mL Intravenous Stopped 9/22/18 0732)   ketorolac (TORADOL) injection 30 mg (30 mg Intravenous Given 9/22/18 0649)   tamsulosin (FLOMAX) capsule 0 4 mg (0 4 mg Oral Given 9/22/18 0754)       Diagnostic Studies  Results Reviewed     Procedure Component Value Units Date/Time    Urine Microscopic [[id number]]  (Abnormal) Collected:  09/22/18 0653    Lab Status:  Final result Specimen:  Urine from Urine, Clean Catch Updated:  09/22/18 0744     RBC, UA 0-5 /hpf      WBC, UA 1-2 (A) /hpf      Epithelial Cells Occasional /hpf      Bacteria, UA Occasional /hpf     Comprehensive metabolic panel [[id number]]  (Abnormal) Collected:  09/22/18 0634    Lab Status:  Final result Specimen:  Blood from Arm, Right Updated:  09/22/18 5279     Sodium 137 mmol/L      Potassium 3 4 (L) mmol/L      Chloride 103 mmol/L      CO2 24 mmol/L      ANION GAP 10 mmol/L      BUN 15 mg/dL Creatinine 1 09 mg/dL      Glucose 125 mg/dL      Calcium 9 0 mg/dL      AST 19 U/L      ALT 39 U/L      Alkaline Phosphatase 68 U/L      Total Protein 7 4 g/dL      Albumin 3 9 g/dL      Total Bilirubin 0 30 mg/dL      eGFR 84 ml/min/1 73sq m     Narrative:         National Kidney Disease Education Program recommendations are as follows:  GFR calculation is accurate only with a steady state creatinine  Chronic Kidney disease less than 60 ml/min/1 73 sq  meters  Kidney failure less than 15 ml/min/1 73 sq  meters      Lipase [[id number]]  (Normal) Collected:  09/22/18 0634    Lab Status:  Final result Specimen:  Blood from Arm, Right Updated:  09/22/18 0702     Lipase 124 u/L     UA w Reflex to Microscopic [[id number]]  (Abnormal) Collected:  09/22/18 0653    Lab Status:  Final result Specimen:  Urine from Urine, Clean Catch Updated:  09/22/18 0658     Color, UA Yellow     Clarity, UA Clear     Specific Gravity, UA >=1 030     pH, UA 6 0     Leukocytes, UA Negative     Nitrite, UA Negative     Protein, UA 30 (1+) (A) mg/dl      Glucose, UA Negative mg/dl      Ketones, UA Negative mg/dl      Urobilinogen, UA 0 2 E U /dl      Bilirubin, UA Negative     Blood, UA Large (A)    CBC and differential [[id number]] Collected:  09/22/18 0634    Lab Status:  Final result Specimen:  Blood from Arm, Right Updated:  09/22/18 0643     WBC 9 96 Thousand/uL      RBC 5 04 Million/uL      Hemoglobin 14 2 g/dL      Hematocrit 43 1 %      MCV 86 fL      MCH 28 2 pg      MCHC 32 9 g/dL      RDW 12 6 %      MPV 11 3 fL      Platelets 748 Thousands/uL      nRBC 0 /100 WBCs      Neutrophils Relative 52 %      Immat GRANS % 1 %      Lymphocytes Relative 37 %      Monocytes Relative 8 %      Eosinophils Relative 2 %      Basophils Relative 0 %      Neutrophils Absolute 5 21 Thousands/µL      Immature Grans Absolute 0 05 Thousand/uL      Lymphocytes Absolute 3 70 Thousands/µL      Monocytes Absolute 0 81 Thousand/µL      Eosinophils Absolute 0 16 Thousand/µL      Basophils Absolute 0 03 Thousands/µL                  CT renal stone study abdomen pelvis without contrast   Final Result by Terry Stone DO (09/22 0725)      Probable 2 mm distal right ureteric calculus causing mild hydroureteronephrosis  Prostatomegaly  Limited study without oral or IV contrast       Workstation performed: [id number]                    Procedures  Procedures       Phone Contacts  ED Phone Contact    ED Course                               MDM  Number of Diagnoses or Management Options  Enlarged prostate:   Hydronephrosis with obstructing calculus:   Ureteral colic:   Diagnosis management comments: Pt is pain free at this time  Reviewed CT report with him  He was evaluated by Dr Stacy Gonzalez previously and would like to f/u with him  Pt also with an enlarged prostate, and he staets that he has noticed a weaker stream when urinating  Will d/c to home with pain meds  Pt agrees with plan       Amount and/or Complexity of Data Reviewed  Clinical lab tests: ordered and reviewed  Tests in the radiology section of CPT®: ordered and reviewed    Risk of Complications, Morbidity, and/or Mortality  Presenting problems: moderate  Diagnostic procedures: moderate  Management options: moderate    Patient Progress  Patient progress: improved    CritCare Time    Disposition  Final diagnoses:   Ureteral colic   Hydronephrosis with obstructing calculus   Enlarged prostate     Time reflects when diagnosis was documented in both MDM as applicable and the Disposition within this note     Time User Action Codes Description Comment    9/22/2018  7:36 AM Dareen Tiskilwa O Add [D07] Ureteral colic     9/12/2233  5:49 AM Dareen Tiskilwa O Add [N13 2] Hydronephrosis with obstructing calculus     9/22/2018  7:54 AM Dareen Tiskilwa O Add [N40 0] Enlarged prostate       ED Disposition     ED Disposition Condition Comment    Discharge  Moustapha Aguero discharge to home/self care      Condition at discharge: Stable        Follow-up Information     Follow up With Specialties Details Why [address]   Schedule an appointment as soon as possible for a visit in 1 day for follow up [id number] [facility]    Lattie Hatchet, MD Urology Schedule an appointment as soon as possible for a visit in 2 days for follow up [address] [facility]  [phone]            Patient's Medications   Discharge Prescriptions    HYDROCODONE-ACETAMINOPHEN (NORCO) 5-325 MG PER TABLET    Take 1 tablet by mouth every 6 (six) hours as needed for pain for up to 10 days Max Daily Amount: 4 tablets       Start Date: 9/22/2018 End Date: 10/2/2018       Order Dose: 1 tablet       Quantity: 12 tablet    Refills: 0    IBUPROFEN (MOTRIN) 600 MG TABLET    Take 1 tablet (600 mg total) by mouth every 6 (six) hours as needed for mild pain       Start Date: 9/22/2018 End Date: --       Order Dose: 600 mg       Quantity: 30 tablet    Refills: 0    ONDANSETRON (ZOFRAN) 4 MG TABLET    Take 1 tablet (4 mg total) by mouth every 6 (six) hours       Start Date: 9/22/2018 End Date: --       Order Dose: 4 mg       Quantity: 12 tablet    Refills: 0    TAMSULOSIN (FLOMAX) 0 4 MG    Take 1 capsule (0 4 mg total) by mouth daily with dinner       Start Date: 9/22/2018 End Date: --       Order Dose: 0 4 mg       Quantity: 30 capsule    Refills: 0     No discharge procedures on file      ED Provider  Electronically Signed by           Taiwo Pritchett DO  09/22/18 4804

## 2023-08-09 ENCOUNTER — OFFICE VISIT (OUTPATIENT)
Dept: FAMILY MEDICINE CLINIC | Facility: CLINIC | Age: 43
End: 2023-08-09

## 2023-08-09 VITALS
OXYGEN SATURATION: 99 % | HEIGHT: 67 IN | RESPIRATION RATE: 18 BRPM | BODY MASS INDEX: 25.74 KG/M2 | DIASTOLIC BLOOD PRESSURE: 70 MMHG | HEART RATE: 61 BPM | SYSTOLIC BLOOD PRESSURE: 120 MMHG | TEMPERATURE: 97.4 F | WEIGHT: 164 LBS

## 2023-08-09 DIAGNOSIS — Z00.00 HEALTHCARE MAINTENANCE: Primary | ICD-10-CM

## 2023-08-09 DIAGNOSIS — Z12.5 PROSTATE CANCER SCREENING: ICD-10-CM

## 2023-08-09 DIAGNOSIS — N20.0 KIDNEY STONE: ICD-10-CM

## 2023-08-09 DIAGNOSIS — Z13.89 SCREENING FOR CARDIOVASCULAR, RESPIRATORY, AND GENITOURINARY DISEASES: ICD-10-CM

## 2023-08-09 DIAGNOSIS — Z13.1 SCREENING FOR DIABETES MELLITUS (DM): ICD-10-CM

## 2023-08-09 DIAGNOSIS — Z13.83 SCREENING FOR CARDIOVASCULAR, RESPIRATORY, AND GENITOURINARY DISEASES: ICD-10-CM

## 2023-08-09 DIAGNOSIS — Z13.6 SCREENING FOR CARDIOVASCULAR, RESPIRATORY, AND GENITOURINARY DISEASES: ICD-10-CM

## 2023-08-09 DIAGNOSIS — Z12.11 COLON CANCER SCREENING: ICD-10-CM

## 2023-08-09 PROCEDURE — 99386 PREV VISIT NEW AGE 40-64: CPT | Performed by: FAMILY MEDICINE

## 2023-08-09 NOTE — PROGRESS NOTES
Assessment/Plan:    No problem-specific Assessment & Plan notes found for this encounter. cpe  Pt requested cpe    Kidney stone hx, been to Dr Daniel Garcia hydration, stone analysis if passes another  kub    Diet/exercise suggested     Diagnoses and all orders for this visit:    Healthcare maintenance    Colon cancer screening  -     Ambulatory referral to Gastroenterology; Future    Screening for cardiovascular, respiratory, and genitourinary diseases  -     Lipid Panel with Direct LDL reflex; Future    Screening for diabetes mellitus (DM)  -     Comprehensive metabolic panel; Future    Prostate cancer screening  -     PSA, ultrasensitive; Future    Kidney stone  -     XR abdomen 1 view kub; Future        Return if symptoms worsen or fail to improve. Subjective:      Patient ID: Beverly Cardenas is a 37 y.o. male. 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    Review of Systems   Respiratory: Negative for shortness of breath. Cardiovascular: Negative for chest pain. Gastrointestinal: Negative for abdominal pain. Genitourinary: Negative for dysuria. No current outpatient medications on file. No current facility-administered medications for this visit.        Objective:    /70   Pulse 61   Temp (!) 97.4 °F (36.3 °C)   Resp 18   Ht 5' 7" (1.702 m)   Wt 74.4 kg (164 lb)   SpO2 99%   BMI 25.69 kg/m² Physical Exam  Vitals and nursing note reviewed. Constitutional:       General: He is not in acute distress. Appearance: He is well-developed. He is not ill-appearing. HENT:      Head: Normocephalic. Right Ear: Tympanic membrane normal.      Left Ear: Tympanic membrane normal.   Eyes:      General: No scleral icterus. Conjunctiva/sclera: Conjunctivae normal.   Neck:      Vascular: No carotid bruit. Cardiovascular:      Rate and Rhythm: Normal rate and regular rhythm. Heart sounds: No murmur heard. Pulmonary:      Effort: Pulmonary effort is normal. No respiratory distress. Breath sounds: No wheezing. Abdominal:      General: There is no distension. Palpations: Abdomen is soft. Tenderness: There is no abdominal tenderness. There is no right CVA tenderness or left CVA tenderness. Hernia: No hernia is present. Genitourinary:     Penis: Normal.       Testes: Normal.      Prostate: Normal.      Rectum: Normal.   Musculoskeletal:         General: No deformity. Cervical back: Neck supple. Right lower leg: No edema. Left lower leg: No edema. Skin:     General: Skin is warm and dry. Coloration: Skin is not pale. Neurological:      Mental Status: He is alert. Motor: No weakness. Gait: Gait normal.   Psychiatric:         Mood and Affect: Mood normal.         Behavior: Behavior normal.         Thought Content: Thought content normal.       BMI Counseling: Body mass index is 25.69 kg/m². The BMI is above normal. Nutrition recommendations include decreasing portion sizes and moderation in carbohydrate intake. Exercise recommendations include exercising 3-5 times per week. No pharmacotherapy was ordered. Rationale for BMI follow-up plan is due to patient being overweight or obese. Depression Screening and Follow-up Plan: Patient was screened for depression during today's encounter. They screened negative with a PHQ-2 score of 0. Catherine Ocampo, DO

## 2023-08-11 LAB
ALBUMIN SERPL-MCNC: 4.7 G/DL (ref 4.1–5.1)
ALBUMIN/GLOB SERPL: 1.9 {RATIO} (ref 1.2–2.2)
ALP SERPL-CCNC: 83 IU/L (ref 44–121)
ALT SERPL-CCNC: 24 IU/L (ref 0–44)
AST SERPL-CCNC: 20 IU/L (ref 0–40)
BILIRUB SERPL-MCNC: 0.4 MG/DL (ref 0–1.2)
BUN SERPL-MCNC: 13 MG/DL (ref 6–24)
BUN/CREAT SERPL: 14 (ref 9–20)
CALCIUM SERPL-MCNC: 9.9 MG/DL (ref 8.7–10.2)
CHLORIDE SERPL-SCNC: 103 MMOL/L (ref 96–106)
CHOLEST SERPL-MCNC: 196 MG/DL (ref 100–199)
CO2 SERPL-SCNC: 22 MMOL/L (ref 20–29)
CREAT SERPL-MCNC: 0.93 MG/DL (ref 0.76–1.27)
EGFR: 103 ML/MIN/1.73
GLOBULIN SER-MCNC: 2.5 G/DL (ref 1.5–4.5)
GLUCOSE SERPL-MCNC: 97 MG/DL (ref 70–99)
HDLC SERPL-MCNC: 42 MG/DL
LDLC SERPL CALC-MCNC: 113 MG/DL (ref 0–99)
MICRODELETION SYND BLD/T FISH: NORMAL
POTASSIUM SERPL-SCNC: 4.5 MMOL/L (ref 3.5–5.2)
PROT SERPL-MCNC: 7.2 G/DL (ref 6–8.5)
PSA SERPL DL<=0.01 NG/ML-MCNC: 4.94 NG/ML (ref 0–4)
SODIUM SERPL-SCNC: 141 MMOL/L (ref 134–144)
TRIGL SERPL-MCNC: 238 MG/DL (ref 0–149)

## 2023-08-14 ENCOUNTER — TELEPHONE (OUTPATIENT)
Dept: FAMILY MEDICINE CLINIC | Facility: CLINIC | Age: 43
End: 2023-08-14

## 2023-08-14 DIAGNOSIS — R97.20 ELEVATED PSA: Primary | ICD-10-CM

## 2023-08-14 NOTE — TELEPHONE ENCOUNTER
Called pt to discuss the lipids - LDL elevated low fat low cholesterol diet redraw in 6 months. PSA is elevated. PT had labs drawn day after RANULFO, I will repeat in a month.   Pt states his RANULFO was perfect

## 2023-08-14 NOTE — TELEPHONE ENCOUNTER
----- Message from Shea Sutton sent at 8/14/2023 11:19 AM EDT -----    ----- Message -----  From: Veena Serna Lab Results In  Sent: 8/11/2023   8:10 AM EDT  To: Yamini Benton DO

## 2023-10-08 PROBLEM — Z13.83 SCREENING FOR CARDIOVASCULAR, RESPIRATORY, AND GENITOURINARY DISEASES: Status: RESOLVED | Noted: 2023-08-09 | Resolved: 2023-10-08

## 2023-10-08 PROBLEM — Z12.5 PROSTATE CANCER SCREENING: Status: RESOLVED | Noted: 2023-08-09 | Resolved: 2023-10-08

## 2023-10-08 PROBLEM — Z00.00 HEALTHCARE MAINTENANCE: Status: RESOLVED | Noted: 2023-08-09 | Resolved: 2023-10-08

## 2023-10-08 PROBLEM — Z12.11 COLON CANCER SCREENING: Status: RESOLVED | Noted: 2023-08-09 | Resolved: 2023-10-08

## 2023-10-08 PROBLEM — Z13.1 SCREENING FOR DIABETES MELLITUS (DM): Status: RESOLVED | Noted: 2023-08-09 | Resolved: 2023-10-08

## 2023-10-08 PROBLEM — Z13.89 SCREENING FOR CARDIOVASCULAR, RESPIRATORY, AND GENITOURINARY DISEASES: Status: RESOLVED | Noted: 2023-08-09 | Resolved: 2023-10-08

## 2023-10-08 PROBLEM — Z13.6 SCREENING FOR CARDIOVASCULAR, RESPIRATORY, AND GENITOURINARY DISEASES: Status: RESOLVED | Noted: 2023-08-09 | Resolved: 2023-10-08

## 2025-03-25 ENCOUNTER — OFFICE VISIT (OUTPATIENT)
Dept: FAMILY MEDICINE CLINIC | Facility: CLINIC | Age: 45
End: 2025-03-25

## 2025-03-25 VITALS
TEMPERATURE: 98.6 F | WEIGHT: 171 LBS | HEART RATE: 76 BPM | RESPIRATION RATE: 18 BRPM | BODY MASS INDEX: 26.78 KG/M2 | DIASTOLIC BLOOD PRESSURE: 70 MMHG | SYSTOLIC BLOOD PRESSURE: 122 MMHG

## 2025-03-25 DIAGNOSIS — Z87.442 HISTORY OF NEPHROLITHIASIS: ICD-10-CM

## 2025-03-25 DIAGNOSIS — R31.0 GROSS HEMATURIA: Primary | ICD-10-CM

## 2025-03-25 LAB
SL AMB  POCT GLUCOSE, UA: NEGATIVE
SL AMB LEUKOCYTE ESTERASE,UA: ABNORMAL
SL AMB POCT BILIRUBIN,UA: NEGATIVE
SL AMB POCT BLOOD,UA: ABNORMAL
SL AMB POCT CLARITY,UA: ABNORMAL
SL AMB POCT COLOR,UA: ABNORMAL
SL AMB POCT KETONES,UA: NEGATIVE
SL AMB POCT NITRITE,UA: NEGATIVE
SL AMB POCT PH,UA: 5.5
SL AMB POCT SPECIFIC GRAVITY,UA: 1.02
SL AMB POCT URINE PROTEIN: ABNORMAL
SL AMB POCT UROBILINOGEN: ABNORMAL

## 2025-03-25 PROCEDURE — 99213 OFFICE O/P EST LOW 20 MIN: CPT | Performed by: NURSE PRACTITIONER

## 2025-03-25 PROCEDURE — 81003 URINALYSIS AUTO W/O SCOPE: CPT | Performed by: NURSE PRACTITIONER

## 2025-03-25 RX ORDER — TAMSULOSIN HYDROCHLORIDE 0.4 MG/1
0.4 CAPSULE ORAL
Qty: 14 CAPSULE | Refills: 1 | Status: SHIPPED | OUTPATIENT
Start: 2025-03-25

## 2025-03-25 RX ORDER — SULFAMETHOXAZOLE AND TRIMETHOPRIM 800; 160 MG/1; MG/1
1 TABLET ORAL EVERY 12 HOURS SCHEDULED
Qty: 10 TABLET | Refills: 0 | Status: SHIPPED | OUTPATIENT
Start: 2025-03-25 | End: 2025-03-30

## 2025-03-25 NOTE — PROGRESS NOTES
Name: Moustapha Aguero      : 1980      MRN: [id number]  Encounter Provider: LAINA Bender  Encounter Date: 3/25/2025   Encounter department: [facility]  :  Assessment & Plan  Gross hematuria  Suspect symptoms may be secondary to nephrolithiasis.  Will cover with Bactrim and send urine for culture.  Will also start on Flomax given urinary hesitancy.  Will order CT for further evaluation and follow-up with results  Orders:  •  POCT urine dip auto non-scope  •  Urine culture  •  sulfamethoxazole-trimethoprim (BACTRIM DS) 800-160 mg per tablet; Take 1 tablet by mouth every 12 (twelve) hours for 5 days  •  tamsulosin (FLOMAX) 0.4 mg; Take 1 capsule (0.4 mg total) by mouth daily with dinner  •  CT renal stone study abdomen pelvis wo contrast; Future    History of nephrolithiasis    Orders:  •  CT renal stone study abdomen pelvis wo contrast; Future           History of Present Illness   Here today with complaints of issues with urination.  Reports last week he had problems with urinary hesitancy and difficulty moving his bowels.  Felt like he had to push/strain to start urinating.  This has waxed and waned since then, and today noticed drops of blood in the toilet bowl with urination.  Also reports consistent dysuria.  No associated fevers, nausea, vomiting, or flank pain.  He does have a history of nephrolithiasis which required stenting in the past.  Has also had workup done for enlarged prostate.        Review of Systems   Constitutional:  Negative for chills and fever.   Cardiovascular: Negative.    Gastrointestinal: Negative.    Genitourinary:         See HPI   All other systems reviewed and are negative.      Objective   /70   Pulse 76   Temp 98.6 °F (37 °C)   Resp 18   Wt 77.6 kg (171 lb)   BMI 26.78 kg/m²      Physical Exam  Constitutional:       General: He is not in acute distress.     Appearance: Normal appearance. He is well-developed. He is not diaphoretic.   Eyes:       Conjunctiva/sclera: Conjunctivae normal.   Pulmonary:      Effort: Pulmonary effort is normal. No respiratory distress.   Neurological:      Mental Status: He is alert.   Psychiatric:         Mood and Affect: Mood normal.         Behavior: Behavior normal.

## 2025-03-26 ENCOUNTER — HOSPITAL ENCOUNTER (OUTPATIENT)
Dept: RADIOLOGY | Facility: HOSPITAL | Age: 45
Discharge: HOME | End: 2025-03-26

## 2025-03-26 ENCOUNTER — TELEPHONE (OUTPATIENT)
Dept: FAMILY MEDICINE CLINIC | Facility: CLINIC | Age: 45
End: 2025-03-26

## 2025-03-26 ENCOUNTER — RESULTS FOLLOW-UP (OUTPATIENT)
Dept: FAMILY MEDICINE CLINIC | Facility: CLINIC | Age: 45
End: 2025-03-26

## 2025-03-26 DIAGNOSIS — Z87.442 HISTORY OF NEPHROLITHIASIS: ICD-10-CM

## 2025-03-26 DIAGNOSIS — Z12.5 SCREENING PSA (PROSTATE SPECIFIC ANTIGEN): Primary | ICD-10-CM

## 2025-03-26 DIAGNOSIS — R97.20 ELEVATED PSA: ICD-10-CM

## 2025-03-26 DIAGNOSIS — R31.0 GROSS HEMATURIA: ICD-10-CM

## 2025-03-26 PROCEDURE — 74176 CT ABD & PELVIS W/O CONTRAST: CPT

## 2025-03-26 NOTE — TELEPHONE ENCOUNTER
Spoke w/ pt regarding results.  He is feeling better with antibiotics.  Discussed that CT shows enlarged prostate which is pressing on his bladder.  He previously saw Dr. Chavarria for his prostate.  Will order PSA level to be checked next week and he will call Dr. Chavarria's office to schedule a follow-up visit.  Will follow-up with results of urine culture and PSA. LAINA Bender

## 2025-03-29 LAB
BACTERIA UR CULT: ABNORMAL
Lab: ABNORMAL
SL AMB ANTIMICROBIAL SUSCEPTIBILITY: ABNORMAL

## 2025-03-30 ENCOUNTER — APPOINTMENT (EMERGENCY)
Dept: RADIOLOGY | Facility: HOSPITAL | Age: 45
End: 2025-03-30

## 2025-03-30 ENCOUNTER — HOSPITAL ENCOUNTER (EMERGENCY)
Facility: HOSPITAL | Age: 45
Discharge: HOME | End: 2025-03-30
Attending: EMERGENCY MEDICINE

## 2025-03-30 VITALS
HEART RATE: 77 BPM | RESPIRATION RATE: 20 BRPM | OXYGEN SATURATION: 98 % | TEMPERATURE: 100.2 F | DIASTOLIC BLOOD PRESSURE: 77 MMHG | SYSTOLIC BLOOD PRESSURE: 130 MMHG

## 2025-03-30 DIAGNOSIS — N39.0 UTI (URINARY TRACT INFECTION): Primary | ICD-10-CM

## 2025-03-30 LAB
ALBUMIN SERPL BCG-MCNC: 4.4 G/DL (ref 3.5–5)
ALP SERPL-CCNC: 82 U/L (ref 34–104)
ALT SERPL W P-5'-P-CCNC: 34 U/L (ref 7–52)
ANION GAP SERPL CALCULATED.3IONS-SCNC: 13 MMOL/L (ref 4–13)
AST SERPL W P-5'-P-CCNC: 19 U/L (ref 13–39)
BACTERIA UR QL AUTO: ABNORMAL /HPF
BASOPHILS # BLD AUTO: 0.04 THOUSANDS/ÂΜL (ref 0–0.1)
BASOPHILS NFR BLD AUTO: 0 % (ref 0–1)
BILIRUB SERPL-MCNC: 0.76 MG/DL (ref 0.2–1)
BILIRUB UR QL STRIP: NEGATIVE
BUN SERPL-MCNC: 12 MG/DL (ref 5–25)
CALCIUM SERPL-MCNC: 9.6 MG/DL (ref 8.4–10.2)
CHLORIDE SERPL-SCNC: 102 MMOL/L (ref 96–108)
CLARITY UR: ABNORMAL
CO2 SERPL-SCNC: 21 MMOL/L (ref 21–32)
COLOR UR: YELLOW
CREAT SERPL-MCNC: 0.94 MG/DL (ref 0.6–1.3)
EOSINOPHIL # BLD AUTO: 0.04 THOUSAND/ÂΜL (ref 0–0.61)
EOSINOPHIL NFR BLD AUTO: 0 % (ref 0–6)
ERYTHROCYTE [DISTWIDTH] IN BLOOD BY AUTOMATED COUNT: 13.5 % (ref 11.6–15.1)
GFR SERPL CREATININE-BSD FRML MDRD: 96 ML/MIN/1.73SQ M
GLUCOSE SERPL-MCNC: 94 MG/DL (ref 65–140)
GLUCOSE UR STRIP-MCNC: NEGATIVE MG/DL
HCT VFR BLD AUTO: 37.7 % (ref 36.5–49.3)
HGB BLD-MCNC: 12.6 G/DL (ref 12–17)
HGB UR QL STRIP.AUTO: NEGATIVE
IMM GRANULOCYTES # BLD AUTO: 0.07 THOUSAND/UL (ref 0–0.2)
IMM GRANULOCYTES NFR BLD AUTO: 0 % (ref 0–2)
KETONES UR STRIP-MCNC: ABNORMAL MG/DL
LACTATE SERPL-SCNC: 0.8 MMOL/L (ref 0.5–2)
LEUKOCYTE ESTERASE UR QL STRIP: ABNORMAL
LYMPHOCYTES # BLD AUTO: 1.69 THOUSANDS/ÂΜL (ref 0.6–4.47)
LYMPHOCYTES NFR BLD AUTO: 10 % (ref 14–44)
MCH RBC QN AUTO: 27.6 PG (ref 26.8–34.3)
MCHC RBC AUTO-ENTMCNC: 33.4 G/DL (ref 31.4–37.4)
MCV RBC AUTO: 83 FL (ref 82–98)
MONOCYTES # BLD AUTO: 1.15 THOUSAND/ÂΜL (ref 0.17–1.22)
MONOCYTES NFR BLD AUTO: 7 % (ref 4–12)
MUCOUS THREADS UR QL AUTO: ABNORMAL
NEUTROPHILS # BLD AUTO: 14.43 THOUSANDS/ÂΜL (ref 1.85–7.62)
NEUTS SEG NFR BLD AUTO: 83 % (ref 43–75)
NITRITE UR QL STRIP: POSITIVE
NON-SQ EPI CELLS URNS QL MICRO: ABNORMAL /HPF
NRBC BLD AUTO-RTO: 0 /100 WBCS
PH UR STRIP.AUTO: 7 [PH]
PLATELET # BLD AUTO: 303 THOUSANDS/UL (ref 149–390)
PMV BLD AUTO: 10.7 FL (ref 8.9–12.7)
POTASSIUM SERPL-SCNC: 3.7 MMOL/L (ref 3.5–5.3)
PROT SERPL-MCNC: 7.4 G/DL (ref 6.4–8.4)
PROT UR STRIP-MCNC: ABNORMAL MG/DL
RBC # BLD AUTO: 4.57 MILLION/UL (ref 3.88–5.62)
RBC #/AREA URNS AUTO: ABNORMAL /HPF
SODIUM SERPL-SCNC: 136 MMOL/L (ref 135–147)
SP GR UR STRIP.AUTO: 1.01 (ref 1–1.03)
UROBILINOGEN UR STRIP-ACNC: <2 MG/DL
WBC # BLD AUTO: 17.42 THOUSAND/UL (ref 4.31–10.16)
WBC #/AREA URNS AUTO: ABNORMAL /HPF

## 2025-03-30 PROCEDURE — 87040 BLOOD CULTURE FOR BACTERIA: CPT | Performed by: EMERGENCY MEDICINE

## 2025-03-30 PROCEDURE — 81001 URINALYSIS AUTO W/SCOPE: CPT | Performed by: EMERGENCY MEDICINE

## 2025-03-30 PROCEDURE — 80053 COMPREHEN METABOLIC PANEL: CPT | Performed by: EMERGENCY MEDICINE

## 2025-03-30 PROCEDURE — 36415 COLL VENOUS BLD VENIPUNCTURE: CPT | Performed by: EMERGENCY MEDICINE

## 2025-03-30 PROCEDURE — 87077 CULTURE AEROBIC IDENTIFY: CPT | Performed by: EMERGENCY MEDICINE

## 2025-03-30 PROCEDURE — 85025 COMPLETE CBC W/AUTO DIFF WBC: CPT | Performed by: EMERGENCY MEDICINE

## 2025-03-30 PROCEDURE — 96375 TX/PRO/DX INJ NEW DRUG ADDON: CPT

## 2025-03-30 PROCEDURE — 87086 URINE CULTURE/COLONY COUNT: CPT | Performed by: EMERGENCY MEDICINE

## 2025-03-30 PROCEDURE — 87186 SC STD MICRODIL/AGAR DIL: CPT | Performed by: EMERGENCY MEDICINE

## 2025-03-30 PROCEDURE — 74177 CT ABD & PELVIS W/CONTRAST: CPT

## 2025-03-30 PROCEDURE — 99284 EMERGENCY DEPT VISIT MOD MDM: CPT

## 2025-03-30 PROCEDURE — 96361 HYDRATE IV INFUSION ADD-ON: CPT

## 2025-03-30 PROCEDURE — 99284 EMERGENCY DEPT VISIT MOD MDM: CPT | Performed by: EMERGENCY MEDICINE

## 2025-03-30 PROCEDURE — 96365 THER/PROPH/DIAG IV INF INIT: CPT

## 2025-03-30 PROCEDURE — 83605 ASSAY OF LACTIC ACID: CPT | Performed by: EMERGENCY MEDICINE

## 2025-03-30 RX ORDER — CEFTRIAXONE 1 G/50ML
1000 INJECTION, SOLUTION INTRAVENOUS ONCE
Status: COMPLETED | OUTPATIENT
Start: 2025-03-30 | End: 2025-03-30

## 2025-03-30 RX ORDER — KETOROLAC TROMETHAMINE 30 MG/ML
15 INJECTION, SOLUTION INTRAMUSCULAR; INTRAVENOUS ONCE
Status: COMPLETED | OUTPATIENT
Start: 2025-03-30 | End: 2025-03-30

## 2025-03-30 RX ORDER — CEPHALEXIN 500 MG/1
500 CAPSULE ORAL 2 TIMES DAILY
Qty: 10 CAPSULE | Refills: 0 | Status: SHIPPED | OUTPATIENT
Start: 2025-03-30 | End: 2025-04-04

## 2025-03-30 RX ADMIN — IOHEXOL 100 ML: 350 INJECTION, SOLUTION INTRAVENOUS at 17:04

## 2025-03-30 RX ADMIN — SODIUM CHLORIDE 1000 ML: 0.9 INJECTION, SOLUTION INTRAVENOUS at 16:14

## 2025-03-30 RX ADMIN — KETOROLAC TROMETHAMINE 15 MG: 30 INJECTION, SOLUTION INTRAMUSCULAR; INTRAVENOUS at 16:14

## 2025-03-30 RX ADMIN — CEFTRIAXONE 1000 MG: 1 INJECTION, SOLUTION INTRAVENOUS at 18:01

## 2025-03-31 ENCOUNTER — TELEPHONE (OUTPATIENT)
Dept: FAMILY MEDICINE CLINIC | Facility: CLINIC | Age: 45
End: 2025-03-31

## 2025-03-31 NOTE — TELEPHONE ENCOUNTER
Reviewed patient's urine culture, unfortunately it was resistant to the antibiotic that I prescribed for him at his visit.  It looks like he was in the ER over the weekend and antibiotic was changed to Keflex, which he should continue.  He should follow-up for any persistent or recurrent symptoms once he finishes the antibiotic. LAINA Bender

## 2025-03-31 NOTE — ED PROVIDER NOTES
Time reflects when diagnosis was documented in both MDM as applicable and the Disposition within this note       Time User Action Codes Description Comment    3/30/2025  6:49 PM Danny Garzon Add [N39.0] UTI (urinary tract infection)           ED Disposition       ED Disposition   Discharge    Condition   Stable    Date/Time   Sun Mar 30, 2025  6:48 PM    Comment   Moustapha Aguero discharge to home/self care.                   Assessment & Plan       Medical Decision Making  Pulse ox 98% on room air indicating adequate oxygenation.      Differential diagnosis include but not limited to at risk for sepsis, UTI, pyelonephritis, urinary retention, constipation, kidney stone      Bladder scan only showed 38 mL.  Patient was hydrated with IV fluids and was able to urinate on his own.  Patient elevated white count UA suggestive of infection.  CT did not show any acute abnormality.  Patient was given a dose of IV antibiotics in the ER and discharged on p.o. antibiotics.    Amount and/or Complexity of Data Reviewed  Labs: ordered.  Radiology: ordered.    Risk  Prescription drug management.             Medications   sodium chloride 0.9 % bolus 1,000 mL (0 mL Intravenous Stopped 3/30/25 1714)   ketorolac (TORADOL) injection 15 mg (15 mg Intravenous Given 3/30/25 1614)   iohexol (OMNIPAQUE) 350 MG/ML injection (MULTI-DOSE) 100 mL (100 mL Intravenous Given 3/30/25 1704)   cefTRIAXone (ROCEPHIN) IVPB (premix in dextrose) 1,000 mg 50 mL (0 mg Intravenous Stopped 3/30/25 1831)       ED Risk Strat Scores                                                History of Present Illness       Chief Complaint   Patient presents with    Abdominal Pain     Recently dx with enlarged prostate, also had ct scan, unable to urinate since this am. Also not able to move bowels now or pass gas       Past Medical History:   Diagnosis Date    Kidney stone       Past Surgical History:   Procedure Laterality Date    KIDNEY STONE SURGERY        Family  History   Problem Relation Age of Onset    No Known Problems Mother     No Known Problems Father       Social History     Tobacco Use    Smoking status: Never    Smokeless tobacco: Never   Vaping Use    Vaping status: Never Used   Substance Use Topics    Alcohol use: No    Drug use: No      E-Cigarette/Vaping    E-Cigarette Use Never User       E-Cigarette/Vaping Substances    Nicotine No     THC No     CBD No     Flavoring No     Other No     Unknown No       I have reviewed and agree with the history as documented.     Patient presents for evaluation of difficulty urinating constipation.  States he has a history of enlarged prostate states he last urinated last night but was able to get a small amount out this morning.  He is also only had small bowel movements over the last couple days and feels like he has backed up.  No vomiting.        History provided by:  Patient   used: No    Abdominal Pain  Associated symptoms: dysuria        Review of Systems   Gastrointestinal:  Positive for abdominal pain.   Genitourinary:  Positive for difficulty urinating and dysuria.   All other systems reviewed and are negative.          Objective       ED Triage Vitals [03/30/25 1546]   Temperature Pulse Blood Pressure Respirations SpO2 Patient Position - Orthostatic VS   100.2 °F (37.9 °C) 96 121/75 (!) 24 97 % Sitting      Temp Source Heart Rate Source BP Location FiO2 (%) Pain Score    Tympanic Monitor Right arm -- 10 - Worst Possible Pain      Vitals      Date and Time Temp Pulse SpO2 Resp BP Pain Score FACES Pain Rating User   03/30/25 1900 -- 77 98 % 20 130/77 -- --    03/30/25 1841 -- 89 99 % 20 118/67 -- --    03/30/25 1716 -- -- -- -- -- 2 --    03/30/25 1630 -- 79 97 % 20 127/67 -- --    03/30/25 1615 -- 83 98 % 23 146/75 -- --    03/30/25 1614 -- -- -- -- -- 10 - Worst Possible Pain --    03/30/25 1546 100.2 °F (37.9 °C) 96 97 % 24 121/75 10 - Worst Possible Pain -- LS            Physical  Exam  Vitals and nursing note reviewed.   Constitutional:       General: He is not in acute distress.  Cardiovascular:      Rate and Rhythm: Normal rate and regular rhythm.   Pulmonary:      Effort: Pulmonary effort is normal. No respiratory distress.      Breath sounds: Normal breath sounds.   Abdominal:      Tenderness: There is no abdominal tenderness.   Neurological:      General: No focal deficit present.      Mental Status: He is alert and oriented to person, place, and time.         Results Reviewed       Procedure Component Value Units Date/Time    Blood culture #2 [[id number]] Collected: 03/30/25 1755    Lab Status: Preliminary result Specimen: Blood from Arm, Left Updated: 03/31/25 0300     Blood Culture Received in Microbiology Lab. Culture in Progress.    Blood culture #1 [[id number]] Collected: 03/30/25 1752    Lab Status: Preliminary result Specimen: Blood from Arm, Right Updated: 03/31/25 0201     Blood Culture Received in Microbiology Lab. Culture in Progress.    Lactic acid, plasma (w/reflex if result > 2.0) [[id number]]  (Normal) Collected: 03/30/25 1755    Lab Status: Final result Specimen: Blood from Arm, Left Updated: 03/30/25 1819     LACTIC ACID 0.8 mmol/L     Narrative:      Result may be elevated if tourniquet was used during collection.    Urine Microscopic [[id number]]  (Abnormal) Collected: 03/30/25 1716    Lab Status: Final result Specimen: Urine, Clean Catch Updated: 03/30/25 1748     RBC, UA 0-1 /hpf      WBC, UA 30-50 /hpf      Epithelial Cells None Seen /hpf      Bacteria, UA Moderate /hpf      MUCUS THREADS Occasional    Urine culture [[id number]] Collected: 03/30/25 1716    Lab Status: In process Specimen: Urine, Clean Catch Updated: 03/30/25 1747    UA w Reflex to Microscopic w Reflex to Culture [[id number]]  (Abnormal) Collected: 03/30/25 1716    Lab Status: Final result Specimen: Urine, Clean Catch Updated: 03/30/25 1737     Color, UA Yellow     Clarity, UA Slightly Cloudy      Specific Gravity, UA 1.015     pH, UA 7.0     Leukocytes, UA Large     Nitrite, UA Positive     Protein, UA Trace mg/dl      Glucose, UA Negative mg/dl      Ketones, UA 10 (1+) mg/dl      Urobilinogen, UA <2.0 mg/dl      Bilirubin, UA Negative     Occult Blood, UA Negative    Comprehensive metabolic panel [[id number]] Collected: 03/30/25 1601    Lab Status: Final result Specimen: Blood from Arm, Left Updated: 03/30/25 1650     Sodium 136 mmol/L      Potassium 3.7 mmol/L      Chloride 102 mmol/L      CO2 21 mmol/L      ANION GAP 13 mmol/L      BUN 12 mg/dL      Creatinine 0.94 mg/dL      Glucose 94 mg/dL      Calcium 9.6 mg/dL      AST 19 U/L      ALT 34 U/L      Alkaline Phosphatase 82 U/L      Total Protein 7.4 g/dL      Albumin 4.4 g/dL      Total Bilirubin 0.76 mg/dL      eGFR 96 ml/min/1.73sq m     Narrative:      [employer] Kid[employer] guidelines for Chronic Kidney Disease (CKD):     Stage 1 with normal or high GFR (GFR > 90 mL/min/1.73 square meters)    Stage 2 Mild CKD (GFR = 60-89 mL/min/1.73 square meters)    Stage 3A Moderate CKD (GFR = 45-59 mL/min/1.73 square meters)    Stage 3B Moderate CKD (GFR = 30-44 mL/min/1.73 square meters)    Stage 4 Severe CKD (GFR = 15-29 mL/min/1.73 square meters)    Stage 5 End Stage CKD (GFR <15 mL/min/1.73 square meters)  Note: GFR calculation is accurate only with a steady state creatinine    CBC and differential [[id number]]  (Abnormal) Collected: 03/30/25 1601    Lab Status: Final result Specimen: Blood from Arm, Left Updated: 03/30/25 1616     WBC 17.42 Thousand/uL      RBC 4.57 Million/uL      Hemoglobin 12.6 g/dL      Hematocrit 37.7 %      MCV 83 fL      MCH 27.6 pg      MCHC 33.4 g/dL      RDW 13.5 %      MPV 10.7 fL      Platelets 303 Thousands/uL      nRBC 0 /100 WBCs      Segmented % 83 %      Immature Grans % 0 %      Lymphocytes % 10 %      Monocytes % 7 %      Eosinophils Relative 0 %      Basophils Relative 0 %      Absolute Neutrophils 14.43  Thousands/µL      Absolute Immature Grans 0.07 Thousand/uL      Absolute Lymphocytes 1.69 Thousands/µL      Absolute Monocytes 1.15 Thousand/µL      Eosinophils Absolute 0.04 Thousand/µL      Basophils Absolute 0.04 Thousands/µL             CT abdomen pelvis with contrast   Final Interpretation by Karin Bonilla MD (03/30 1842)      No acute findings in the abdomen or pelvis.         Workstation performed: [id number]             Procedures    ED Medication and Procedure Management   Prior to Admission Medications   Prescriptions Last Dose Informant Patient Reported? Taking?   sulfamethoxazole-trimethoprim (BACTRIM DS) 800-160 mg per tablet Not Taking  No No   Sig: Take 1 tablet by mouth every 12 (twelve) hours for 5 days   Patient not taking: Reported on 3/30/2025   tamsulosin (FLOMAX) 0.4 mg 3/29/2025  No Yes   Sig: Take 1 capsule (0.4 mg total) by mouth daily with dinner      Facility-Administered Medications: None     Discharge Medication List as of 3/30/2025  6:49 PM        START taking these medications    Details   cephalexin (KEFLEX) 500 mg capsule Take 1 capsule (500 mg total) by mouth 2 (two) times a day for 5 days, Starting Sun 3/30/2025, Until Fri 4/4/2025, Normal           CONTINUE these medications which have NOT CHANGED    Details   tamsulosin (FLOMAX) 0.4 mg Take 1 capsule (0.4 mg total) by mouth daily with dinner, Starting Tue 3/25/2025, Normal      sulfamethoxazole-trimethoprim (BACTRIM DS) 800-160 mg per tablet Take 1 tablet by mouth every 12 (twelve) hours for 5 days, Starting Tue 3/25/2025, Until Sun 3/30/2025, Normal           No discharge procedures on file.  ED SEPSIS DOCUMENTATION   Time reflects when diagnosis was documented in both MDM as applicable and the Disposition within this note       Time User Action Codes Description Comment    3/30/2025  6:49 PM Danny Garzon Add [N39.0] UTI (urinary tract infection)                  Danny Garzon DO  03/31/25 1609

## 2025-04-01 ENCOUNTER — RESULTS FOLLOW-UP (OUTPATIENT)
Dept: EMERGENCY DEPT | Facility: HOSPITAL | Age: 45
End: 2025-04-01

## 2025-04-02 LAB — BACTERIA UR CULT: ABNORMAL

## 2025-04-05 LAB
BACTERIA BLD CULT: NORMAL
BACTERIA BLD CULT: NORMAL

## 2025-05-04 ENCOUNTER — HOSPITAL ENCOUNTER (EMERGENCY)
Facility: HOSPITAL | Age: 45
Discharge: HOME | End: 2025-05-04
Attending: EMERGENCY MEDICINE
Payer: COMMERCIAL

## 2025-05-04 VITALS
SYSTOLIC BLOOD PRESSURE: 123 MMHG | TEMPERATURE: 97.6 F | OXYGEN SATURATION: 97 % | WEIGHT: 169.2 LBS | BODY MASS INDEX: 26.5 KG/M2 | RESPIRATION RATE: 18 BRPM | DIASTOLIC BLOOD PRESSURE: 75 MMHG | HEART RATE: 62 BPM

## 2025-05-04 DIAGNOSIS — N30.91 HEMORRHAGIC CYSTITIS: Primary | ICD-10-CM

## 2025-05-04 LAB
BACTERIA UR QL AUTO: ABNORMAL /HPF
BILIRUB UR QL STRIP: NEGATIVE
CLARITY UR: ABNORMAL
COLOR UR: YELLOW
GLUCOSE UR STRIP-MCNC: NEGATIVE MG/DL
HGB UR QL STRIP.AUTO: ABNORMAL
KETONES UR STRIP-MCNC: NEGATIVE MG/DL
LEUKOCYTE ESTERASE UR QL STRIP: ABNORMAL
NITRITE UR QL STRIP: POSITIVE
NON-SQ EPI CELLS URNS QL MICRO: ABNORMAL /HPF
PH UR STRIP.AUTO: 5.5 [PH]
PROT UR STRIP-MCNC: NEGATIVE MG/DL
RBC #/AREA URNS AUTO: ABNORMAL /HPF
SP GR UR STRIP.AUTO: >=1.03 (ref 1–1.03)
UROBILINOGEN UR STRIP-ACNC: <2 MG/DL
WBC #/AREA URNS AUTO: ABNORMAL /HPF

## 2025-05-04 PROCEDURE — 87186 SC STD MICRODIL/AGAR DIL: CPT | Performed by: EMERGENCY MEDICINE

## 2025-05-04 PROCEDURE — 87086 URINE CULTURE/COLONY COUNT: CPT | Performed by: EMERGENCY MEDICINE

## 2025-05-04 PROCEDURE — 99284 EMERGENCY DEPT VISIT MOD MDM: CPT | Performed by: EMERGENCY MEDICINE

## 2025-05-04 PROCEDURE — 87077 CULTURE AEROBIC IDENTIFY: CPT | Performed by: EMERGENCY MEDICINE

## 2025-05-04 PROCEDURE — 99284 EMERGENCY DEPT VISIT MOD MDM: CPT

## 2025-05-04 PROCEDURE — 81001 URINALYSIS AUTO W/SCOPE: CPT | Performed by: EMERGENCY MEDICINE

## 2025-05-04 RX ORDER — CEPHALEXIN 500 MG/1
500 CAPSULE ORAL EVERY 8 HOURS SCHEDULED
Qty: 21 CAPSULE | Refills: 0 | Status: SHIPPED | OUTPATIENT
Start: 2025-05-04 | End: 2025-05-11

## 2025-05-04 RX ORDER — CEPHALEXIN 500 MG/1
500 CAPSULE ORAL ONCE
Status: COMPLETED | OUTPATIENT
Start: 2025-05-04 | End: 2025-05-04

## 2025-05-04 RX ADMIN — CEPHALEXIN 500 MG: 500 CAPSULE ORAL at 23:30

## 2025-05-05 NOTE — ED PROVIDER NOTES
Time reflects when diagnosis was documented in both MDM as applicable and the Disposition within this note       Time User Action Codes Description Comment    5/4/2025 11:29 PM GlennaTaj glasgow Add [N30.91] Hemorrhagic cystitis           ED Disposition       ED Disposition   Discharge    Condition   Stable    Date/Time   Sun May 4, 2025 11:29 PM    Comment   Moustapha Minerufino discharge to home/self care.                   Assessment & Plan       Medical Decision Making  45-year-old male presenting to the ED today with dysuria.  Likely urinary tract infection.  No CVA tenderness to suggest kidney stone.  Will do UA with reflex to micro and culture.    Patient's urinalysis consistent with urinary tract infection.  Will place on Keflex. Prescription sent to the pharmacy and patient was discharged. Thankfully he has outpatient follow-up with urology already scheduled.  Encouraged him not to miss that appointment obviously.  Return precautions discussed and patient was discharged home.    Amount and/or Complexity of Data Reviewed  Labs: ordered.    Risk  Prescription drug management.             Medications   cephalexin (KEFLEX) capsule 500 mg (500 mg Oral Given 5/4/25 2330)       ED Risk Strat Scores                    No data recorded        SBIRT 20yo+      Flowsheet Row Most Recent Value   Initial Alcohol Screen: US AUDIT-C     1. How often do you have a drink containing alcohol? 0 Filed at: 05/04/2025 2257   3a. Male UNDER 65: How often do you have five or more drinks on one occasion? 0 Filed at: 05/04/2025 2257   Audit-C Score 0 Filed at: 05/04/2025 2257   JESSICA: How many times in the past year have you...    Used an illegal drug or used a prescription medication for non-medical reasons? Never Filed at: 05/04/2025 2257                            History of Present Illness       Chief Complaint   Patient presents with    Possible UTI     Treated here 3/30/25 for UTI. Started with burning on end of urination a few days  ago.     Flank Pain     Had mild right flank 2 days ago and again this morning. Denies N/V       Past Medical History:   Diagnosis Date    Kidney stone     Urinary tract infection       Past Surgical History:   Procedure Laterality Date    KIDNEY STONE SURGERY        Family History   Problem Relation Age of Onset    No Known Problems Mother     No Known Problems Father       Social History     Tobacco Use    Smoking status: Never    Smokeless tobacco: Never   Vaping Use    Vaping status: Never Used   Substance Use Topics    Alcohol use: No    Drug use: No      E-Cigarette/Vaping    E-Cigarette Use Never User       E-Cigarette/Vaping Substances    Nicotine No     THC No     CBD No     Flavoring No     Other No     Unknown No       I have reviewed and agree with the history as documented.     45-year-old male past medical history significant for BPH presenting to the ED today for urinary symptoms.  Says that he is developing dysuria.  Was just seen here last month with similar symptoms.  No hematuria that he was seeing at this time.  No fevers.  Has intermittent flank pain he says.        Review of Systems   Constitutional:  Negative for chills and fever.   HENT:  Negative for hearing loss.    Eyes:  Negative for visual disturbance.   Respiratory:  Negative for shortness of breath.    Cardiovascular:  Negative for chest pain.   Gastrointestinal:  Negative for abdominal pain, constipation, diarrhea, nausea and vomiting.   Genitourinary:  Positive for dysuria.   Musculoskeletal:  Negative for myalgias.   Skin:  Negative for rash.   Neurological:  Negative for dizziness.   Psychiatric/Behavioral:  Negative for agitation.    All other systems reviewed and are negative.          Objective       ED Triage Vitals [05/04/25 2253]   Temperature Pulse Blood Pressure Respirations SpO2 Patient Position - Orthostatic VS   97.6 °F (36.4 °C) 62 123/75 18 97 % Sitting      Temp Source Heart Rate Source BP Location FiO2 (%) Pain Score     Tympanic Monitor Left arm -- 4      Vitals      Date and Time Temp Pulse SpO2 Resp BP Pain Score FACES Pain Rating User   05/04/25 2253 97.6 °F (36.4 °C) 62 97 % 18 123/75 4 --             Physical Exam  Vitals and nursing note reviewed.   Constitutional:       General: He is not in acute distress.     Appearance: Normal appearance. He is not ill-appearing.   HENT:      Head: Normocephalic and atraumatic.      Right Ear: External ear normal.      Left Ear: External ear normal.      Nose: Nose normal. No congestion.      Mouth/Throat:      Mouth: Mucous membranes are moist.      Pharynx: Oropharynx is clear. No oropharyngeal exudate.   Eyes:      General:         Right eye: No discharge.         Left eye: No discharge.      Extraocular Movements: Extraocular movements intact.      Conjunctiva/sclera: Conjunctivae normal.      Pupils: Pupils are equal, round, and reactive to light.   Cardiovascular:      Rate and Rhythm: Normal rate and regular rhythm.      Pulses: Normal pulses.      Heart sounds: Normal heart sounds.   Pulmonary:      Effort: Pulmonary effort is normal. No respiratory distress.      Breath sounds: Normal breath sounds. No wheezing.   Abdominal:      General: Abdomen is flat. Bowel sounds are normal. There is no distension.      Palpations: Abdomen is soft.      Tenderness: There is no abdominal tenderness.   Musculoskeletal:         General: No swelling or deformity. Normal range of motion.      Cervical back: Normal range of motion. No rigidity.   Skin:     General: Skin is warm and dry.      Capillary Refill: Capillary refill takes less than 2 seconds.   Neurological:      General: No focal deficit present.      Mental Status: He is alert and oriented to person, place, and time. Mental status is at baseline.      Cranial Nerves: No cranial nerve deficit.      Motor: No weakness.      Gait: Gait normal.   Psychiatric:         Mood and Affect: Mood normal.         Behavior: Behavior normal.          Results Reviewed       Procedure Component Value Units Date/Time    Urine Microscopic [[id number]]  (Abnormal) Collected: 05/04/25 2302    Lab Status: Final result Specimen: Urine, Clean Catch Updated: 05/04/25 2317     RBC, UA       Field obscured, unable to enumerate     /hpf     WBC, UA Innumerable /hpf      Epithelial Cells       Field obscured, unable to enumerate     /hpf     Bacteria, UA Innumerable /hpf     Urine culture [[id number]] Collected: 05/04/25 2302    Lab Status: In process Specimen: Urine, Clean Catch Updated: 05/04/25 2317    UA w Reflex to Microscopic w Reflex to Culture [[id number]]  (Abnormal) Collected: 05/04/25 2302    Lab Status: Final result Specimen: Urine, Clean Catch Updated: 05/04/25 2310     Color, UA Yellow     Clarity, UA Slightly Cloudy     Specific Gravity, UA >=1.030     pH, UA 5.5     Leukocytes, UA Large     Nitrite, UA Positive     Protein, UA Negative mg/dl      Glucose, UA Negative mg/dl      Ketones, UA Negative mg/dl      Urobilinogen, UA <2.0 mg/dl      Bilirubin, UA Negative     Occult Blood, UA Trace            No orders to display       Procedures    ED Medication and Procedure Management   Prior to Admission Medications   Prescriptions Last Dose Informant Patient Reported? Taking?   tamsulosin (FLOMAX) 0.4 mg   No No   Sig: Take 1 capsule (0.4 mg total) by mouth daily with dinner      Facility-Administered Medications: None     Discharge Medication List as of 5/4/2025 11:30 PM        START taking these medications    Details   cephalexin (KEFLEX) 500 mg capsule Take 1 capsule (500 mg total) by mouth every 8 (eight) hours for 7 days, Starting Sun 5/4/2025, Until Sun 5/11/2025, Normal           CONTINUE these medications which have NOT CHANGED    Details   tamsulosin (FLOMAX) 0.4 mg Take 1 capsule (0.4 mg total) by mouth daily with dinner, Starting Tue 3/25/2025, Normal           No discharge procedures on file.  ED SEPSIS DOCUMENTATION   Time reflects when  diagnosis was documented in both MDM as applicable and the Disposition within this note       Time User Action Codes Description Comment    5/4/2025 11:29 PM Taj Manzanares Add [N30.91] Hemorrhagic cystitis                  Taj Manzanares MD  05/05/25 0101

## 2025-05-05 NOTE — DISCHARGE INSTRUCTIONS
You will take 1 capsule of the Keflex every 8 hours for the next of days.  Please follow-up at your appointment with urology.    Return to ER if you develop fever.

## 2025-05-06 ENCOUNTER — RESULTS FOLLOW-UP (OUTPATIENT)
Dept: EMERGENCY DEPT | Facility: HOSPITAL | Age: 45
End: 2025-05-06

## 2025-05-07 LAB — BACTERIA UR CULT: ABNORMAL

## 2025-07-15 ENCOUNTER — HOSPITAL ENCOUNTER (OUTPATIENT)
Dept: RADIOLOGY | Facility: HOSPITAL | Age: 45
Discharge: HOME | End: 2025-07-15
Attending: SPECIALIST
Payer: SUBSIDIZED

## 2025-07-15 DIAGNOSIS — R97.20 ELEVATED PROSTATE SPECIFIC ANTIGEN (PSA): ICD-10-CM

## 2025-07-15 PROCEDURE — 72197 MRI PELVIS W/O & W/DYE: CPT

## 2025-07-15 PROCEDURE — A9585 GADOBUTROL INJECTION: HCPCS | Performed by: SPECIALIST

## 2025-07-15 RX ORDER — GADOBUTROL 604.72 MG/ML
8 INJECTION INTRAVENOUS
Status: COMPLETED | OUTPATIENT
Start: 2025-07-15 | End: 2025-07-15

## 2025-07-15 RX ADMIN — GADOBUTROL 8 ML: 604.72 INJECTION INTRAVENOUS at 08:24
